# Patient Record
Sex: FEMALE | Race: BLACK OR AFRICAN AMERICAN | NOT HISPANIC OR LATINO
[De-identification: names, ages, dates, MRNs, and addresses within clinical notes are randomized per-mention and may not be internally consistent; named-entity substitution may affect disease eponyms.]

---

## 2020-07-28 PROBLEM — Z00.00 ENCOUNTER FOR PREVENTIVE HEALTH EXAMINATION: Status: ACTIVE | Noted: 2020-07-28

## 2020-07-30 ENCOUNTER — APPOINTMENT (OUTPATIENT)
Dept: ORTHOPEDIC SURGERY | Facility: CLINIC | Age: 55
End: 2020-07-30
Payer: MEDICARE

## 2020-07-30 VITALS — HEIGHT: 66 IN | BODY MASS INDEX: 40.66 KG/M2 | WEIGHT: 253 LBS

## 2020-07-30 DIAGNOSIS — M25.462 EFFUSION, LEFT KNEE: ICD-10-CM

## 2020-07-30 DIAGNOSIS — S83.289A OTHER TEAR OF LATERAL MENISCUS, CURRENT INJURY, UNSPECIFIED KNEE, INITIAL ENCOUNTER: ICD-10-CM

## 2020-07-30 PROCEDURE — 99204 OFFICE O/P NEW MOD 45 MIN: CPT | Mod: 25

## 2020-07-30 PROCEDURE — 73562 X-RAY EXAM OF KNEE 3: CPT | Mod: LT

## 2020-07-30 PROCEDURE — 20610 DRAIN/INJ JOINT/BURSA W/O US: CPT | Mod: LT

## 2020-08-12 ENCOUNTER — APPOINTMENT (OUTPATIENT)
Dept: PULMONOLOGY | Facility: CLINIC | Age: 55
End: 2020-08-12
Payer: MEDICARE

## 2020-08-12 VITALS
SYSTOLIC BLOOD PRESSURE: 125 MMHG | BODY MASS INDEX: 44.98 KG/M2 | HEIGHT: 65.16 IN | HEART RATE: 91 BPM | OXYGEN SATURATION: 90 % | WEIGHT: 273.25 LBS | DIASTOLIC BLOOD PRESSURE: 84 MMHG | RESPIRATION RATE: 17 BRPM

## 2020-08-12 DIAGNOSIS — G47.33 OBSTRUCTIVE SLEEP APNEA (ADULT) (PEDIATRIC): ICD-10-CM

## 2020-08-12 DIAGNOSIS — Z82.49 FAMILY HISTORY OF ISCHEMIC HEART DISEASE AND OTHER DISEASES OF THE CIRCULATORY SYSTEM: ICD-10-CM

## 2020-08-12 DIAGNOSIS — J45.909 UNSPECIFIED ASTHMA, UNCOMPLICATED: ICD-10-CM

## 2020-08-12 DIAGNOSIS — I10 ESSENTIAL (PRIMARY) HYPERTENSION: ICD-10-CM

## 2020-08-12 PROCEDURE — 99204 OFFICE O/P NEW MOD 45 MIN: CPT

## 2020-08-12 RX ORDER — LOSARTAN POTASSIUM 100 MG/1
TABLET, FILM COATED ORAL
Refills: 0 | Status: ACTIVE | COMMUNITY

## 2020-08-12 RX ORDER — AMLODIPINE BESYLATE 10 MG/1
10 TABLET ORAL
Refills: 0 | Status: ACTIVE | COMMUNITY

## 2020-08-12 RX ORDER — INDAPAMIDE 2.5 MG/1
2.5 TABLET, FILM COATED ORAL
Refills: 0 | Status: ACTIVE | COMMUNITY

## 2020-08-12 NOTE — ASSESSMENT
[FreeTextEntry1] : This is a 55 year old female referred by Dr. Bhatti for evaluation of possible sleep apnea. The patient has a prior diagnosis of BIRD and continues to have multiple signs and symptoms of sleep-disordered breathing including snoring and multiple nocturnal awakenings. She was referred to the [facility] for a diagnostic HSAT. The importance of weight loss as it related to BIRD was discussed and he was referred to Dr. Kulwinder Martino for weight management. She will follow up with us after the HSAT.

## 2020-08-12 NOTE — HISTORY OF PRESENT ILLNESS
[Snoring] : snoring [Frequent Nocturnal Awakening] : frequent nocturnal awakening [ESS: ___] : ESS score [unfilled] [Daytime Somnolence] : daytime somnolence [Awakes Unrefreshed] : awakening unrefreshed [Unintentional Sleep While Inactive] : unintentional sleep while inactive [Awakening With Dry Mouth] : awakening with dry mouth [To Bed: ___] : ~he/she~ goes to bed at [unfilled] [Recent  Weight Gain] : recent weight gain [Sleep Onset Latency: ___ minutes] : sleep onset latency of [unfilled] minutes reported [Arises: ___] : arises at [unfilled] [WASO: ___] : Wake time after sleep onset is [unfilled] [Nocturnal Awakenings: ___] : ~he/she~ typically has [unfilled] nocturnal awakenings [Daytime Sleep: ___] : daytime sleep: [unfilled] [TST: ___] : Total sleep time is [unfilled] [FreeTextEntry1] : This is a 55 year old female referred by Dr. Bhatti for evaluation of possible sleep apnea.\par \par Patient states that she had two prior sleep studies showing sleep apnea in the past. She was prescribed CPAP was taken back due to non-compliance which she states was after a vacation. She admits that it was difficult to tolerate. She reports snoring and nighttime awakenings for unclear reasons. Sometimes she wakes up due to leg pain.  Other sleep-related symptoms and sleep schedule are as listed below.\par \par Comorbid medical conditions include hypertension, diabetes, asthma, and osteoarthritis. Recently found to have left knee meniscal tear. \par   [Witnessed Apneas] : no witnessed sleep apnea [Unintentional Sleep while Active] : no unintentional sleep while active [Awakes with Headache] : no headache upon awakening

## 2020-08-12 NOTE — CONSULT LETTER
[Dear  ___] : Dear  [unfilled], [Consult Letter:] : I had the pleasure of evaluating your patient, [unfilled]. [Please see my note below.] : Please see my note below. [Consult Closing:] : Thank you very much for allowing me to participate in the care of this patient.  If you have any questions, please do not hesitate to contact me. [Sincerely,] : Sincerely, [FreeTextEntry3] : Rebeca Gibbons MD\par Pulmonary, Critical Care, and Sleep Medicine\par  of Medicine\par [employer] [facility]

## 2020-08-12 NOTE — PHYSICAL EXAM
[IV] : IV [Enlarged Base of the Tongue] : enlargement of the base of the tongue [General Appearance - Well Developed] : well developed [Normal Appearance] : normal appearance [Well Groomed] : well groomed [No Deformities] : no deformities [General Appearance - Well Nourished] : well nourished [Apical Impulse] : the apical impulse was normal [Neck Appearance] : the appearance of the neck was normal [General Appearance - In No Acute Distress] : no acute distress [Heart Sounds] : normal S1 and S2 [Heart Sounds Gallop] : no gallops [Heart Rate And Rhythm] : heart rate was normal and rhythm regular [Respiration, Rhythm And Depth] : normal respiratory rhythm and effort [Auscultation Breath Sounds / Voice Sounds] : lungs were clear to auscultation bilaterally [Exaggerated Use Of Accessory Muscles For Inspiration] : no accessory muscle use [Abnormal Walk] : normal gait [Involuntary Movements] : no involuntary movements were seen [Musculoskeletal - Swelling] : no joint swelling seen [Skin Color & Pigmentation] : normal skin color and pigmentation [Cyanosis, Localized] : no localized cyanosis [Nail Clubbing] : no clubbing of the fingernails [] : no rash [Skin Turgor] : normal skin turgor [No Focal Deficits] : no focal deficits [Oriented To Time, Place, And Person] : oriented to person, place, and time [Impaired Insight] : insight and judgment were intact [Affect] : the affect was normal [Mood] : the mood was normal

## 2020-08-12 NOTE — REASON FOR VISIT
[Consultation] : a consultation visit [Sleep Apnea] : sleep apnea [FreeTextEntry1] : Referred by Dr. Bhatti

## 2020-08-12 NOTE — REVIEW OF SYSTEMS
[EDS: ESS=____] : daytime somnolence: ESS=[unfilled] [Snoring] : snoring [A.M. Dry Mouth] : a.m. dry mouth [Obesity] : obesity [Diabetes] : diabetes  [Anemia] : anemia [History of Iron Deficiency] : history of iron deficiency [Nocturia] : nocturia [Arthralgias] : arthralgias [Heartburn] : heartburn [Nasal Congestion] : no nasal congestion [Witnessed Apneas] : no witnessed apnea [Shortness Of Breath] : no shortness of breath [Chest Pain] : no chest pain [Palpitations] : no palpitations [Edema] : ~T edema was not present [CHF] : no congestive heart failure [A.M. Headache] : no headache present upon awakening [Thyroid Disease] : no thyroid disease [Depression] : no depression [Fibromyalgia] : no fibromyalgia [Anxious] : not anxious [Difficulty Maintaining Sleep] : no difficulty maintaining sleep [Lower Extremity Discomfort] : no lower extremity discomfort [Difficulty Initiating Sleep] : no difficulty falling asleep [Unusual Sleep Behavior] : no unusual sleep behavior [Hypersomnolence] : not sleeping much more than usual

## 2020-08-16 ENCOUNTER — FORM ENCOUNTER (OUTPATIENT)
Age: 55
End: 2020-08-16

## 2020-08-17 ENCOUNTER — APPOINTMENT (OUTPATIENT)
Dept: SLEEP CENTER | Facility: CLINIC | Age: 55
End: 2020-08-17
Payer: MEDICARE

## 2020-08-17 ENCOUNTER — OUTPATIENT (OUTPATIENT)
Dept: OUTPATIENT SERVICES | Facility: HOSPITAL | Age: 55
LOS: 1 days | End: 2020-08-17
Payer: MEDICARE

## 2020-08-17 PROCEDURE — 95806 SLEEP STUDY UNATT&RESP EFFT: CPT | Mod: 26

## 2020-08-17 PROCEDURE — 95806 SLEEP STUDY UNATT&RESP EFFT: CPT

## 2020-08-24 DIAGNOSIS — G47.33 OBSTRUCTIVE SLEEP APNEA (ADULT) (PEDIATRIC): ICD-10-CM

## 2020-11-12 ENCOUNTER — APPOINTMENT (OUTPATIENT)
Dept: ORTHOPEDIC SURGERY | Facility: CLINIC | Age: 55
End: 2020-11-12

## 2021-03-11 ENCOUNTER — APPOINTMENT (OUTPATIENT)
Dept: ORTHOPEDIC SURGERY | Facility: CLINIC | Age: 56
End: 2021-03-11
Payer: MEDICARE

## 2021-03-11 VITALS — HEART RATE: 89 BPM | SYSTOLIC BLOOD PRESSURE: 139 MMHG | DIASTOLIC BLOOD PRESSURE: 81 MMHG

## 2021-03-11 PROCEDURE — 20610 DRAIN/INJ JOINT/BURSA W/O US: CPT | Mod: LT

## 2021-03-11 PROCEDURE — 99214 OFFICE O/P EST MOD 30 MIN: CPT | Mod: 25

## 2021-03-11 PROCEDURE — 73562 X-RAY EXAM OF KNEE 3: CPT | Mod: LT

## 2021-03-18 ENCOUNTER — LABORATORY RESULT (OUTPATIENT)
Age: 56
End: 2021-03-18

## 2021-03-18 ENCOUNTER — APPOINTMENT (OUTPATIENT)
Dept: PULMONOLOGY | Facility: CLINIC | Age: 56
End: 2021-03-18
Payer: MEDICARE

## 2021-03-18 VITALS
HEART RATE: 90 BPM | DIASTOLIC BLOOD PRESSURE: 84 MMHG | HEIGHT: 65 IN | WEIGHT: 274 LBS | SYSTOLIC BLOOD PRESSURE: 140 MMHG | OXYGEN SATURATION: 95 % | BODY MASS INDEX: 45.65 KG/M2 | TEMPERATURE: 97.3 F

## 2021-03-18 DIAGNOSIS — E66.01 MORBID (SEVERE) OBESITY DUE TO EXCESS CALORIES: ICD-10-CM

## 2021-03-18 PROCEDURE — 99214 OFFICE O/P EST MOD 30 MIN: CPT | Mod: GC

## 2021-03-18 NOTE — ASSESSMENT
[FreeTextEntry1] : 55F hx HTN, DM, asthma, OA, who comes for follow up. \par \par #BIRD\par Pt has history of snoring, obesity, daytime fatigue/sleepiness, and previously HST showing mild sleep apnea. However T90 52.6% of the study. Given obesity, concerning for OHS/BIRD. Will obtain PAP titration study with TCO2. \par \par The patient was referred to the [facility] for CPAP titration for further assessment. The ramifications of obstructive sleep apnea and its potential therapeutic modalities were discussed with the patient. The dangers of drowsy driving were discussed with the patient. The patient was warned to avoid drowsy driving. The patient will followup after results of this study are available.\par \par #Asthma\par Pt has hx of asthma. Never smoker. Currently on advair 500 bid and albuterol prn (has not used albuterol over the past week). SOB started a week ago and pt has had persistent cough productive of greenish sputum. Last steroid course and hospitalization was several years ago. On exam today, she has rhonchi throughout, which clears with cough. No wheezing. Pt potentially has mild asthma exacerbation - however pt declines steroids at this time and states that symptoms are somewhat mild. Needs better control of her asthma. 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

## 2021-03-18 NOTE — HISTORY OF PRESENT ILLNESS
[FreeTextEntry1] : 55F hx HTN, DM, asthma, OA, who comes for follow up. Last seen on 8/12/2020. Pt states that she was unable to get CPAP titration study done as she could not schedule the study. She continues to have frequent nighttime awakening due to knee pain and fatigue/sleepiness during the day. States that she goes to bed at around 8PM, falls asleep within 15 minutes and gets up at around 5AM. On average she thinks she sleeps about 4 hours. Upon awakening she usually feels ok and does not get fatigue/sleepy until 12-1PM. Does not nap during the day but if she had the opportunity she would. +snoring. No AM headaches. Previously used CPAP over 12 years ago but could not tolerate due to noise and discomfort. Has gained about 3-4 lbs since last visit. \par \par Pt also c/o persistent, productive cough - greenish phlegm. Also has had SOB for the past week and pt thinks it was instigated by cortisone shot in her knee. Has a history of asthma, which is being managed by her PMD. Currently taking advair and albuterol. Has not required extra rescue doses of albuterol over the past week. No nighttime awakenings. Cannot think of a trigger to recent SOB. States she always has a cough. Had PFTs several years ago. Last course of steroids and hospitalization was several years ago. Triggers include cold weather, exertion, AC, smoke and perfumes. No hx of COVID.\par \par HST 8/17/2020: PRAVIN 12.6/hr, T90 52.6%

## 2021-03-18 NOTE — PHYSICAL EXAM
[General Appearance - Well Developed] : well developed [General Appearance - Well Nourished] : well nourished [Neck Appearance] : the appearance of the neck was normal [Heart Rate And Rhythm] : heart rate was normal and rhythm regular [Heart Sounds] : normal S1 and S2 [] : no respiratory distress [Bowel Sounds] : normal bowel sounds [Abnormal Walk] : normal gait [Nail Clubbing] : no clubbing of the fingernails [Cyanosis, Localized] : no localized cyanosis [Skin Color & Pigmentation] : normal skin color and pigmentation [Skin Lesions] : no skin lesions [Motor Exam] : the motor exam was normal [No Focal Deficits] : no focal deficits [Oriented To Time, Place, And Person] : oriented to person, place, and time [Mood] : the mood was normal [IV] : IV [FreeTextEntry1] : +rhonchi b/l, cleared after cough

## 2021-03-18 NOTE — REVIEW OF SYSTEMS
[EDS: ESS=____] : daytime somnolence: ESS=[unfilled] [Fatigue] : fatigue [Snoring] : snoring [Shortness Of Breath] : shortness of breath [Obesity] : obesity [Difficulty Maintaining Sleep] : difficulty maintaining sleep [Negative] : Psychiatric [Nasal Congestion] : no nasal congestion [Chest Pain] : no chest pain [Palpitations] : no palpitations [A.M. Headache] : no headache present upon awakening [Leg Dysesthesias] : no leg dysesthesias [Difficulty Initiating Sleep] : no difficulty falling asleep [Lower Extremity Discomfort] : no lower extremity discomfort [Late day/ Evening symptoms] : no late day/evening symptoms [Unusual Sleep Behavior] : no unusual sleep behavior [Hypersomnolence] : not sleeping much more than usual [Cataplexy] :  no cataplexy

## 2021-03-18 NOTE — REASON FOR VISIT
[Follow-Up] : a follow-up visit [Asthma] : asthma [Nocturna Hypoventilation] : nocturna  hypoventilation [Sleep Apnea] : sleep apnea

## 2021-03-24 ENCOUNTER — NON-APPOINTMENT (OUTPATIENT)
Age: 56
End: 2021-03-24

## 2021-03-24 LAB
BACTERIA SPT CF RESP CULT: NORMAL
BASOPHILS # BLD AUTO: 0.03 K/UL
BASOPHILS NFR BLD AUTO: 0.3 %
EOSINOPHIL # BLD AUTO: 0.31 K/UL
EOSINOPHIL NFR BLD AUTO: 3.4 %
HCT VFR BLD CALC: 42.1 %
HGB BLD-MCNC: 12.9 G/DL
IGE SER-MCNC: 125 KU/L
IMM GRANULOCYTES NFR BLD AUTO: 0.5 %
LYMPHOCYTES # BLD AUTO: 3.41 K/UL
LYMPHOCYTES NFR BLD AUTO: 37.3 %
MAN DIFF?: NORMAL
MCHC RBC-ENTMCNC: 25.4 PG
MCHC RBC-ENTMCNC: 30.6 GM/DL
MCV RBC AUTO: 83 FL
MONOCYTES # BLD AUTO: 0.74 K/UL
MONOCYTES NFR BLD AUTO: 8.1 %
NEUTROPHILS # BLD AUTO: 4.6 K/UL
NEUTROPHILS NFR BLD AUTO: 50.4 %
PLATELET # BLD AUTO: 347 K/UL
RBC # BLD: 5.07 M/UL
RBC # FLD: 17 %
WBC # FLD AUTO: 9.14 K/UL

## 2021-03-25 LAB
A ALTERNATA IGE QN: 9.02 KUA/L
A FUMIGATUS IGE QN: 0.18 KUA/L
BERMUDA GRASS IGE QN: <0.1 KUA/L
BOXELDER IGE QN: <0.1 KUA/L
C HERBARUM IGE QN: 0.29 KUA/L
CALIF WALNUT IGE QN: <0.1 KUA/L
CAT DANDER IGE QN: 0.36 KUA/L
CMN PIGWEED IGE QN: 0.15 KUA/L
COMMON RAGWEED IGE QN: <0.1 KUA/L
COTTONWOOD IGE QN: <0.1 KUA/L
D FARINAE IGE QN: 0.1 KUA/L
D PTERONYSS IGE QN: 0.13 KUA/L
DEPRECATED A ALTERNATA IGE RAST QL: 3
DEPRECATED A FUMIGATUS IGE RAST QL: NORMAL
DEPRECATED BERMUDA GRASS IGE RAST QL: 0
DEPRECATED BOXELDER IGE RAST QL: 0
DEPRECATED C HERBARUM IGE RAST QL: NORMAL
DEPRECATED CAT DANDER IGE RAST QL: 1
DEPRECATED COMMON PIGWEED IGE RAST QL: NORMAL
DEPRECATED COMMON RAGWEED IGE RAST QL: 0
DEPRECATED COTTONWOOD IGE RAST QL: 0
DEPRECATED D FARINAE IGE RAST QL: NORMAL
DEPRECATED D PTERONYSS IGE RAST QL: NORMAL
DEPRECATED DOG DANDER IGE RAST QL: NORMAL
DEPRECATED GOOSEFOOT IGE RAST QL: NORMAL
DEPRECATED LONDON PLANE IGE RAST QL: 0
DEPRECATED MOUSE URINE PROT IGE RAST QL: 0
DEPRECATED MUGWORT IGE RAST QL: 0
DEPRECATED P NOTATUM IGE RAST QL: NORMAL
DEPRECATED RED CEDAR IGE RAST QL: 0
DEPRECATED ROACH IGE RAST QL: NORMAL
DEPRECATED SHEEP SORREL IGE RAST QL: 0
DEPRECATED SILVER BIRCH IGE RAST QL: 0
DEPRECATED TIMOTHY IGE RAST QL: 0
DEPRECATED WHITE ASH IGE RAST QL: 0
DEPRECATED WHITE OAK IGE RAST QL: 0
DOG DANDER IGE QN: 0.11 KUA/L
GOOSEFOOT IGE QN: 0.13 KUA/L
LONDON PLANE IGE QN: <0.1 KUA/L
MOUSE URINE PROT IGE QN: <0.1 KUA/L
MUGWORT IGE QN: <0.1 KUA/L
MULBERRY (T70) CLASS: 0
MULBERRY (T70) CONC: <0.1 KUA/L
P NOTATUM IGE QN: 0.21 KUA/L
RED CEDAR IGE QN: <0.1 KUA/L
ROACH IGE QN: 0.22 KUA/L
SHEEP SORREL IGE QN: <0.1 KUA/L
SILVER BIRCH IGE QN: <0.1 KUA/L
TIMOTHY IGE QN: <0.1 KUA/L
TREE ALLERG MIX1 IGE QL: 0
WHITE ASH IGE QN: <0.1 KUA/L
WHITE ELM IGE QN: 0
WHITE ELM IGE QN: <0.1 KUA/L
WHITE OAK IGE QN: <0.1 KUA/L

## 2021-03-27 ENCOUNTER — APPOINTMENT (OUTPATIENT)
Dept: DISASTER EMERGENCY | Facility: CLINIC | Age: 56
End: 2021-03-27

## 2021-03-27 DIAGNOSIS — Z01.818 ENCOUNTER FOR OTHER PREPROCEDURAL EXAMINATION: ICD-10-CM

## 2021-03-28 LAB — SARS-COV-2 N GENE NPH QL NAA+PROBE: NOT DETECTED

## 2021-04-01 ENCOUNTER — APPOINTMENT (OUTPATIENT)
Dept: SLEEP CENTER | Facility: CLINIC | Age: 56
End: 2021-04-01
Payer: MEDICARE

## 2021-04-01 ENCOUNTER — OUTPATIENT (OUTPATIENT)
Dept: OUTPATIENT SERVICES | Facility: HOSPITAL | Age: 56
LOS: 1 days | End: 2021-04-01
Payer: MEDICARE

## 2021-04-01 PROCEDURE — 95811 POLYSOM 6/>YRS CPAP 4/> PARM: CPT

## 2021-04-01 PROCEDURE — 95811 POLYSOM 6/>YRS CPAP 4/> PARM: CPT | Mod: 26

## 2021-04-02 ENCOUNTER — TRANSCRIPTION ENCOUNTER (OUTPATIENT)
Age: 56
End: 2021-04-02

## 2021-04-02 DIAGNOSIS — G47.33 OBSTRUCTIVE SLEEP APNEA (ADULT) (PEDIATRIC): ICD-10-CM

## 2021-04-13 ENCOUNTER — NON-APPOINTMENT (OUTPATIENT)
Age: 56
End: 2021-04-13

## 2021-04-20 ENCOUNTER — NON-APPOINTMENT (OUTPATIENT)
Age: 56
End: 2021-04-20

## 2021-04-20 DIAGNOSIS — R06.89 OTHER ABNORMALITIES OF BREATHING: ICD-10-CM

## 2021-04-22 ENCOUNTER — NON-APPOINTMENT (OUTPATIENT)
Age: 56
End: 2021-04-22

## 2021-04-22 DIAGNOSIS — R94.2 ABNORMAL RESULTS OF PULMONARY FUNCTION STUDIES: ICD-10-CM

## 2021-04-22 DIAGNOSIS — J96.12 CHRONIC RESPIRATORY FAILURE WITH HYPERCAPNIA: ICD-10-CM

## 2021-04-26 DIAGNOSIS — G47.36 SLEEP RELATED HYPOVENTILATION IN CONDITIONS CLASSIFIED ELSEWHERE: ICD-10-CM

## 2021-05-03 ENCOUNTER — RX RENEWAL (OUTPATIENT)
Age: 56
End: 2021-05-03

## 2021-06-16 ENCOUNTER — APPOINTMENT (OUTPATIENT)
Dept: PULMONOLOGY | Facility: CLINIC | Age: 56
End: 2021-06-16
Payer: MEDICARE

## 2021-06-16 VITALS
WEIGHT: 276 LBS | HEART RATE: 96 BPM | OXYGEN SATURATION: 95 % | SYSTOLIC BLOOD PRESSURE: 135 MMHG | BODY MASS INDEX: 45.93 KG/M2 | DIASTOLIC BLOOD PRESSURE: 81 MMHG | TEMPERATURE: 98 F | RESPIRATION RATE: 17 BRPM

## 2021-06-16 DIAGNOSIS — Z23 ENCOUNTER FOR IMMUNIZATION: ICD-10-CM

## 2021-06-16 PROCEDURE — 99214 OFFICE O/P EST MOD 30 MIN: CPT

## 2021-06-17 NOTE — ASSESSMENT
[FreeTextEntry1] : 55 year old female asthma, mild BIRD, nocturnal hypoventilation due to OHS on bilevel, here for a follow up visit after initiation of therapy.\par 1) In regards to BRID/BIRD, data is unavailable on Care . Reached out to [facility] regarding this issue. Will perform nocturnal oximetry on bilevel once data available. Discuss the recent [employer] voluntary recall for DreamStation bilevel devices and the decision was made to continue therapy after a very thorough discussion of the risks and benefits of continued use until their repaired or fully replaced given the nocturnal hypoventilation and potential harm if pausing therapy.\par  2) Asthma- currently with mild wheezing. Has not been using Advair. Resume Advair and if no significant improvement in symptoms she will contact me over next 1 - 2 days. \par \par Follow up in 2 weeks\par

## 2021-06-17 NOTE — PHYSICAL EXAM
[General Appearance - Well Developed] : well developed [General Appearance - Well Nourished] : well nourished [IV] : IV [Neck Appearance] : the appearance of the neck was normal [Heart Rate And Rhythm] : heart rate was normal and rhythm regular [Heart Sounds] : normal S1 and S2 [] : no respiratory distress [Abnormal Walk] : normal gait [Bowel Sounds] : normal bowel sounds [Nail Clubbing] : no clubbing of the fingernails [Cyanosis, Localized] : no localized cyanosis [Skin Color & Pigmentation] : normal skin color and pigmentation [Skin Lesions] : no skin lesions [Motor Exam] : the motor exam was normal [No Focal Deficits] : no focal deficits [Oriented To Time, Place, And Person] : oriented to person, place, and time [Mood] : the mood was normal [FreeTextEntry1] : +rhonchi b/l, cleared after cough

## 2021-06-17 NOTE — REVIEW OF SYSTEMS
[EDS: ESS=____] : daytime somnolence: ESS=[unfilled] [Fatigue] : fatigue [Shortness Of Breath] : shortness of breath [Obesity] : obesity [Difficulty Maintaining Sleep] : difficulty maintaining sleep [Negative] : Psychiatric [Nasal Congestion] : no nasal congestion [Snoring] : no snoring [Chest Pain] : no chest pain [Palpitations] : no palpitations [A.M. Headache] : no headache present upon awakening [Leg Dysesthesias] : no leg dysesthesias [Difficulty Initiating Sleep] : no difficulty falling asleep [Lower Extremity Discomfort] : no lower extremity discomfort [Late day/ Evening symptoms] : no late day/evening symptoms [Unusual Sleep Behavior] : no unusual sleep behavior [Hypersomnolence] : not sleeping much more than usual [Cataplexy] :  no cataplexy

## 2021-06-17 NOTE — HISTORY OF PRESENT ILLNESS
[FreeTextEntry1] : 55 year old female asthma, mild BIRD, nocturnal hypoventilation on bilevel, here for a follow up visit after initiation of therapy.\par \par Comorbid medical conditions include hypertension, diabetes, and osteoarthritis. \par \par Previously diagnosed with BIRD and was on CPAP ~ 12 years ago but could not tolerate. HST (8/17/2020) showed an PRAVIN of 12.6/hr, T90 of 52.6% with baseline saturation 89-91% suggestive of nocturnal hypoventilation. Bilevel titration (4/1/2021) showed persistent hypercapnia and hypoxemia despite optimal bilevel and a Trilogy device was ordered but denied coverage so the decision was made to treat with bilevel S at a pressure of 20/10 cm H2O and eventually check nocturnal oximetry on these settings. She has been using bilevel for about 1 month and trying her best to use nightly for 4 - 5 hours per night. Sometimes the mask does not fit appropriately. DME is [facility]. \par \par In regards to asthma, she has started Singulair with improvement in asthma symptoms. She has not been using Advair and notes some chest tightness.

## 2021-07-07 ENCOUNTER — APPOINTMENT (OUTPATIENT)
Dept: PULMONOLOGY | Facility: CLINIC | Age: 56
End: 2021-07-07
Payer: MEDICARE

## 2021-07-07 VITALS
HEIGHT: 65 IN | DIASTOLIC BLOOD PRESSURE: 76 MMHG | TEMPERATURE: 97.6 F | BODY MASS INDEX: 46.15 KG/M2 | WEIGHT: 277 LBS | HEART RATE: 82 BPM | RESPIRATION RATE: 16 BRPM | OXYGEN SATURATION: 97 % | SYSTOLIC BLOOD PRESSURE: 111 MMHG

## 2021-07-07 PROCEDURE — 99214 OFFICE O/P EST MOD 30 MIN: CPT

## 2021-07-07 NOTE — PROCEDURE
[FreeTextEntry1] : REYNA WEST  is here in the office due to trouble tolerating CPAP mask.  \par 55 year old REYNA WEST on BiPAP therapy for OHS, mild BIRD. \par currently utilizing a airtouch F20 mask, was referred to our office for a mask fitting\par for better fit. \par \par \par \par The following service was provided for the patient in-office:\par -	Patient was fitted with the following mask(s): eson 2 S & M, F30 i sm f, sm cush, could benefit from stand frame, Vitera sm, forgot to give pt med head gear.  will leave for pt on next visit. \par -	Patient was educated on mask interfaces, usage, fit, switching masks with tubing, and PAP cleaning tips.\par -	Patient was advised to try mask at home with their PAP machine. \par -	Pt will call or email in about one week to F/U on mask fits.\par \par discussed recall as pt has not felt comfortable to use PAP since she heard about it.  Discussed the foam degradation and possible effects.  Weighed the risk vs the benefits of continuing therapy. I advised pt Eve plans to fix or replace all affected devices but we do not have a time line for when. Advised against drowsy driving. Advised against so clean & ozone based .  As per Dr Gibbons pt needs this PAP for nocturnal hypoventilation and cannot pause therapy, pt advised and agreed.\par \par  \par \par \par \par \par    \par \par Time spent with pt:       30                        \par \par \par \par \par \par

## 2021-07-19 ENCOUNTER — APPOINTMENT (OUTPATIENT)
Dept: PULMONOLOGY | Facility: CLINIC | Age: 56
End: 2021-07-19
Payer: MEDICARE

## 2021-07-19 VITALS
TEMPERATURE: 97.3 F | HEIGHT: 65 IN | BODY MASS INDEX: 46.65 KG/M2 | DIASTOLIC BLOOD PRESSURE: 79 MMHG | HEART RATE: 80 BPM | WEIGHT: 280 LBS | SYSTOLIC BLOOD PRESSURE: 120 MMHG | RESPIRATION RATE: 16 BRPM

## 2021-07-19 PROCEDURE — 99214 OFFICE O/P EST MOD 30 MIN: CPT

## 2021-07-19 NOTE — PROCEDURE
[FreeTextEntry1] : REYNA WEST  is here in the office due to trouble tolerating CPAP mask.  \par 55 year old REYNA WEST on PAP therapy at 10/20 cm H2O for mild degree of BIRD & severe desats, \par currently utilizing a ffm mask, was referred to our office for a mask fitting\par for better fit, trouble acclimating. \par \par \par \par The following service was provided for the patient in-office:\par -	Patient was fitted with the following mask(s): Resmed airtopuch N20 . Masks were tried in office with pts pap. Pt ramp tuned on to [id number] for 30 mins. \par -	Patient was educated on mask interfaces, usage, fit, switching masks with tubing, and PAP cleaning tips.\par -	Patient was advised to try mask at home with their PAP machine. \par -	Pt will call or email in about one week to F/U on mask fits.\par \par \par \par \par    \par \par Time spent with pt:        30                       \par \par \par \par \par \par

## 2021-10-07 ENCOUNTER — APPOINTMENT (OUTPATIENT)
Dept: PULMONOLOGY | Facility: CLINIC | Age: 56
End: 2021-10-07
Payer: MEDICARE

## 2021-10-07 DIAGNOSIS — E66.2 MORBID (SEVERE) OBESITY WITH ALVEOLAR HYPOVENTILATION: ICD-10-CM

## 2021-10-07 PROCEDURE — 99214 OFFICE O/P EST MOD 30 MIN: CPT | Mod: 95

## 2021-10-07 NOTE — ASSESSMENT
[FreeTextEntry1] : 56 year old female asthma, mild BIRD, nocturnal hypoventilation due to OHS on bilevel, here for a follow up visit.\par \par 1) In regards to BIRD/BIRD, data is unavailable on Care . She states [facility] contacted her to reset SD Card/modem but she admittedly has not really used because she states she is scared given the recall. Reviwed risks vs benefits and she would like to hold off on using. Given the hypoventilation is due to obesity she will try weight loss and would like to a sleep study while she is propped on pillows. Device is registered with [employer].\par  2) Asthma- Continue Advair and Singulair.\par \par Follow up in after sleep study.\par

## 2021-10-07 NOTE — HISTORY OF PRESENT ILLNESS
[Home] : at home, [unfilled] , at the time of the visit. [Medical Office: ([facility])___] : at the medical office located in  [Verbal consent obtained from patient] : the patient, [unfilled] [FreeTextEntry1] : 56 year old female asthma, mild BIRD, nocturnal hypoventilation on bilevel, here for a follow up visit.\par \par Comorbid medical conditions include hypertension, diabetes, and osteoarthritis. \par \par Previously diagnosed with BIRD and was on CPAP ~ 12 years ago but could not tolerate. HST (8/17/2020) showed an PRAVIN of 12.6/hr, T90 of 52.6% with baseline saturation 89-91% suggestive of nocturnal hypoventilation. Bilevel titration (4/1/2021) showed persistent hypercapnia and hypoxemia despite optimal bilevel and a Trilogy device was ordered but denied coverage so the decision was made to treat with bilevel S at a pressure of 20/10 cm H2O and eventually check nocturnal oximetry on these settings. DME is [facility]. \par \par She has not used the device because she is afraid given the recall. In regards to asthma, she is doing well and uses Advair BID and Singulair. She rarely requires rescue inhaler.

## 2021-10-07 NOTE — REVIEW OF SYSTEMS
[EDS: ESS=____] : daytime somnolence: ESS=[unfilled] [Fatigue] : fatigue [Nasal Congestion] : no nasal congestion [Snoring] : no snoring [Shortness Of Breath] : shortness of breath [Chest Pain] : no chest pain [Palpitations] : no palpitations [Obesity] : obesity [A.M. Headache] : no headache present upon awakening [Leg Dysesthesias] : no leg dysesthesias [Difficulty Initiating Sleep] : no difficulty falling asleep [Difficulty Maintaining Sleep] : difficulty maintaining sleep [Lower Extremity Discomfort] : no lower extremity discomfort [Late day/ Evening symptoms] : no late day/evening symptoms [Unusual Sleep Behavior] : no unusual sleep behavior [Hypersomnolence] : not sleeping much more than usual [Cataplexy] :  no cataplexy [Negative] : Psychiatric

## 2021-11-17 ENCOUNTER — APPOINTMENT (OUTPATIENT)
Dept: SLEEP CENTER | Facility: CLINIC | Age: 56
End: 2021-11-17
Payer: MEDICARE

## 2021-11-17 ENCOUNTER — OUTPATIENT (OUTPATIENT)
Dept: OUTPATIENT SERVICES | Facility: HOSPITAL | Age: 56
LOS: 1 days | End: 2021-11-17
Payer: MEDICARE

## 2021-11-17 PROCEDURE — G0399: CPT

## 2021-11-17 PROCEDURE — 95806 SLEEP STUDY UNATT&RESP EFFT: CPT | Mod: 26

## 2021-11-22 ENCOUNTER — RX RENEWAL (OUTPATIENT)
Age: 56
End: 2021-11-22

## 2021-11-22 ENCOUNTER — NON-APPOINTMENT (OUTPATIENT)
Age: 56
End: 2021-11-22

## 2021-11-22 DIAGNOSIS — G47.33 OBSTRUCTIVE SLEEP APNEA (ADULT) (PEDIATRIC): ICD-10-CM

## 2022-02-16 ENCOUNTER — APPOINTMENT (OUTPATIENT)
Dept: PULMONOLOGY | Facility: CLINIC | Age: 57
End: 2022-02-16
Payer: MEDICARE

## 2022-02-16 VITALS
RESPIRATION RATE: 16 BRPM | BODY MASS INDEX: 43.37 KG/M2 | WEIGHT: 260.31 LBS | SYSTOLIC BLOOD PRESSURE: 130 MMHG | HEART RATE: 93 BPM | DIASTOLIC BLOOD PRESSURE: 79 MMHG | HEIGHT: 65 IN | OXYGEN SATURATION: 98 %

## 2022-02-16 PROCEDURE — G0009: CPT

## 2022-02-16 PROCEDURE — 99214 OFFICE O/P EST MOD 30 MIN: CPT | Mod: 25

## 2022-02-16 PROCEDURE — 90732 PPSV23 VACC 2 YRS+ SUBQ/IM: CPT

## 2022-02-16 RX ORDER — FLUTICASONE PROPIONATE AND SALMETEROL 100; 50 UG/1; UG/1
100-50 POWDER RESPIRATORY (INHALATION) TWICE DAILY
Qty: 3 | Refills: 3 | Status: ACTIVE | COMMUNITY
Start: 1900-01-01 | End: 1900-01-01

## 2022-02-16 NOTE — PHYSICAL EXAM
[General Appearance - Well Developed] : well developed [General Appearance - Well Nourished] : well nourished [IV] : IV [Neck Appearance] : the appearance of the neck was normal [Heart Rate And Rhythm] : heart rate was normal and rhythm regular [Heart Sounds] : normal S1 and S2 [] : no respiratory distress [Bowel Sounds] : normal bowel sounds [Abnormal Walk] : normal gait [Nail Clubbing] : no clubbing of the fingernails [Cyanosis, Localized] : no localized cyanosis [Skin Color & Pigmentation] : normal skin color and pigmentation [Skin Lesions] : no skin lesions [Motor Exam] : the motor exam was normal [No Focal Deficits] : no focal deficits [Oriented To Time, Place, And Person] : oriented to person, place, and time [Mood] : the mood was normal [FreeTextEntry1] : +rhonchi b/l, cleared after cough

## 2022-02-16 NOTE — HISTORY OF PRESENT ILLNESS
[FreeTextEntry1] : 56 year old female asthma, mild BIRD, nocturnal hypoventilation on bilevel, here for a follow up visit.\par \par Comorbid medical conditions include hypertension, diabetes, osteoarthritis, and class III obesity. She has lost about 20 lbs, currently 260 lbs. Weight was 253 lbs around time of initial HST in 2020.\par \par Previously diagnosed with BIRD and was on CPAP ~ 12 years ago but could not tolerate. HST (8/17/2020) showed an PRAVIN of 12.6/hr, T90 of 52.6% with baseline saturation 89-91% suggestive of nocturnal hypoventilation. Bilevel titration (4/1/2021) showed persistent hypercapnia and hypoxemia despite optimal bilevel and a Trilogy device was ordered but denied coverage so the decision was made to treat with bilevel S at a pressure of 20/10 cm H2O and eventually check nocturnal oximetry on these settings. However, due to the recall device was stopped. Repeat HST (11/17/2021) showed an AHI of 24.6/hr and T90 of 62.2% Advised to resume therapy. She received replacement but was sent an incorrect device. She received the correct device about one month prior but she was afraid to start using again. DME is [facility]. \par \par In regards to asthma, she is doing well and uses Advair BID and Singulair. She rarely requires rescue inhaler unless she has not used Advair.

## 2022-02-16 NOTE — ASSESSMENT
[FreeTextEntry1] : 56 year old female asthma, mild BIRD, nocturnal hypoventilation due to OHS on bilevel, here for a follow up visit.\par \par 1) BIRD/OHS - Has not been using due to recall. Advised patient importance of use and given than she received replacement, she should resume therapy. She will email me picture of latest device received.\par \par  2) Asthma- Continue Advair and Singulair. Refills sent to pharmacy.\par \par 3) HCM - Pneumovax today. UTD with COVID vaccination including booster. Declines flu shot today.\par \par Follow up in 3 weeks for compliance check/data download.\par

## 2022-03-03 ENCOUNTER — APPOINTMENT (OUTPATIENT)
Dept: PULMONOLOGY | Facility: CLINIC | Age: 57
End: 2022-03-03
Payer: MEDICARE

## 2022-03-03 PROCEDURE — 99214 OFFICE O/P EST MOD 30 MIN: CPT | Mod: 95

## 2022-03-03 NOTE — ASSESSMENT
[FreeTextEntry1] : 56 year old female asthma, mild BIRD, nocturnal hypoventilation due to OHS on bilevel, here for a follow up visit for compliance check and data download.\par \par BIRD/OHS -unfortunately her device is not connected and care orchestrated.  [facility] and asked them to reset the modem.  I will follow up with the patient after the data is available.\par \par \par

## 2022-03-03 NOTE — HISTORY OF PRESENT ILLNESS
[Home] : at home, [unfilled] , at the time of the visit. [Medical Office: ([facility])___] : at the medical office located in  [Verbal consent obtained from patient] : the patient, [unfilled] [FreeTextEntry1] : 56 year old female asthma, mild BIRD, nocturnal hypoventilation on bilevel, here for a follow up visit.\par \par Comorbid medical conditions include hypertension, diabetes, osteoarthritis, and class III obesity. She has lost about 20 lbs, currently 260 lbs. Weight was 253 lbs around time of initial HST in 2020.\par \par Previously diagnosed with BIRD and was on CPAP ~ 12 years ago but could not tolerate. HST (8/17/2020) showed an PRAVIN of 12.6/hr, T90 of 52.6% with baseline saturation 89-91% suggestive of nocturnal hypoventilation. Bilevel titration (4/1/2021) showed persistent hypercapnia and hypoxemia despite optimal bilevel and a Trilogy device was ordered but denied coverage so the decision was made to treat with bilevel S at a pressure of 20/10 cm H2O and eventually check nocturnal oximetry on these settings. However, due to the recall device was stopped. Repeat HST (11/17/2021) showed an AHI of 24.6/hr and T90 of 62.2% Advised to resume therapy. She received replacement but was sent an incorrect device. DME is [facility].  Data is unfortunately unavailable on Care .  She has resumed using therapy and feels a benefit from the use she is tolerating the pressures but feels that the air is too warm.\par \par In regards to asthma, she is doing well and uses Advair BID and Singulair. She rarely requires rescue inhaler unless she has not used Advair.

## 2022-03-10 ENCOUNTER — APPOINTMENT (OUTPATIENT)
Dept: PULMONOLOGY | Facility: CLINIC | Age: 57
End: 2022-03-10

## 2022-04-08 ENCOUNTER — APPOINTMENT (OUTPATIENT)
Dept: ORTHOPEDIC SURGERY | Facility: CLINIC | Age: 57
End: 2022-04-08
Payer: MEDICARE

## 2022-04-08 DIAGNOSIS — M17.12 UNILATERAL PRIMARY OSTEOARTHRITIS, LEFT KNEE: ICD-10-CM

## 2022-04-08 PROCEDURE — 73562 X-RAY EXAM OF KNEE 3: CPT | Mod: LT

## 2022-04-08 PROCEDURE — 99215 OFFICE O/P EST HI 40 MIN: CPT

## 2022-04-08 NOTE — ADDENDUM
[FreeTextEntry1] : I, James Penn, acted solely as a scribe for Dr. Ezio Deluna MD on this date 04/08/2022  .\par  \par All medical record entries made by the Scribe were at my, Dr. Ezio Deluna MD , direction and personally dictated by me on 04/08/2022 . I have reviewed the chart and agree that the record accurately reflects my personal performance of the history, physical exam, assessment and plan. I have also personally directed, reviewed, and agreed with the chart.

## 2022-04-08 NOTE — PHYSICAL EXAM
[Antalgic] : antalgic [Normal RUE] : Right Upper Extremity: No scars, rashes, lesions, ulcers, skin intact [Normal Finger/nose] : finger to nose coordination [Normal Touch] : sensation intact for touch [Obese] : obese [Normal] : no peripheral adenopathy appreciated [Poor Appearance] : well-appearing [Acute Distress] : not in acute distress [de-identified] : Patient appears stated age in no acute respiratory distress. Patient is alert oriented x3. Patient has normal mood and affect. \par \par Right knee exam\par Range of motion of the knee is 0-120°. \par Skin is normal.  No rash.\par There is no effusion. No medial or lateral joint line tenderness. No swelling, no pitting edema.\par Overall alignment of the knee is then slight valgus. Good anterior posterior stability. Firm endpoints on anterior and posterior drawer. \par Medial lateral stability is intact. Firm endpoint on medial and lateral stress testing .\par Shelly test is negative.\par Quadriceps strength 5/ 5. There is no loss of muscle volume in the thigh. \par Good anterior posterior and mediolateral stability.\par Sensation in the extremities intact. \par Discrimination is intact. Good DP and PT pulses.\par 		\par \par Bilateral hip exam\par On inspection of the hip shows skin is normal. No evidence of rash. \par No loss of muscle.  Abductor strength is 5 out of 5. Hip flexor strength is 5.\par Range of motion of the hip at 90° flexion internal rotation is 15° external rotation is 30° pain-free. \par Hip has good stability in anterior and posterior direction. \par On lateral decubitus  examination there is no tenderness in the greater trochanter. \par Lower Extremity Examination \par Bilateral lower extremity skin is normal. There is no rash. There is no edema and lymphadenopathy.  DP and PT pulses intact. Sensation is intact.\par \par Left knee exam\par There is minimal to moderate effusion. Range of motion is 0-120°. Painful at the extremes of range of motion. \par There is medial and lateral joint line tenderness. \par Quadriceps strength is 4/5. There is some muscle loss in the quadriceps. \par Anteroposterior and mediolateral stability is intact Shelly test is negative. Alignment of the knee is in 10 degrees valgus [de-identified] : X-rays of the left knee 3 views shows advanced degenerative bone-on-bone valgus arthritis.\par \par MRI shows severe degenerative lateral meniscus tear and bone-on-bone lateral lateral compartment arthritis and patellofemoral arthritis

## 2022-04-08 NOTE — DISCUSSION/SUMMARY
[Surgical risks reviewed] : Surgical risks reviewed [de-identified] : Patient has advanced left knee bone-on-bone degenerative arthritis.  This time treatment options was discussed with the patient including conservative and surgical treatment options.\par \par At this point patient has tried all conservative treatment options including weight loss NSAIDs. Patient also has had multiple injections in the knee. This has not helped the patient. Patient is considering surgical treatment options. All the pros and cons of operative versus nonoperative treatment discussed with the patient. All of the potential risks including possible infection possible bleeding possible need for repeat surgery possible medical complications possible blood clots discussed with the patient. Patient completely understands the risks and benefits.\par The risks of re-admission discussed with the patient as well. Also the need for anticoagulation discussed with the patient. Patient prefers to take aspirin 325 twice a day.\par Patient completely understands the risks and benefits and wants to undergo the procedure - LEFT TOTAL KNEE REPLACEMENT.\par I reviewed the plan of care as well as used a model of a total knee implant equivalent to the one that will be used for their total knee  joint replacement. The ability to secure the implant utilizing cement or cementless (press-fit) was discussed with the patient. The patient agrees with the plan of care, as well as the use of implants for their total joint replacement.\par The patient is advised to get medical clearance. \par Patient is also advised to attend a joint replacement education class.\par

## 2022-04-08 NOTE — HISTORY OF PRESENT ILLNESS
[Worsening] : worsening [___ yrs] : [unfilled] year(s) ago [7] : a current pain level of 7/10 [10] : a maximum pain level of 10/10 [Sitting] : sitting [Standing] : standing [Intermit.] : ~He/She~ states the symptoms seem to be intermittent [Walking] : worsened by walking [Knee Flexion] : worsened with knee flexion [Knee Extension] : worsened with knee extension [Acetaminophen] : relieved by acetaminophen [NSAIDs] : relieved by nonsteroidal anti-inflammatory drugs [Rest] : relieved by rest [de-identified] : :Patient presents to office following up for left knee pain, last visit received Cortizone injection with relief for over 6 months, here today due to symptoms having returned. \par Patient is an obese female, PMhx htn, asthma, dm2,  presents to the office walking on her own complaining of left knee pain x 2 years, insidious onset, progressively worsening after Patein started exercise regiment. Reports swelling, instability and weakness. Pain indicated to anterolateral aspect of knee, 7/10, intermittent,achy and sharp at times. \par Worsening with walking and stairs to point affecting quality of life and preventing some adl's. \par Some relief with rest, physical therapy and Nsaids. (x 1 year). Presents with MRI ([facility])\par Patient denies any fever, chills, trauma, swelling, erythema, hematomas, numbness or tingling sensation, instability or buckling.\par

## 2022-04-13 ENCOUNTER — APPOINTMENT (OUTPATIENT)
Dept: CT IMAGING | Facility: CLINIC | Age: 57
End: 2022-04-13
Payer: MEDICARE

## 2022-04-13 ENCOUNTER — OUTPATIENT (OUTPATIENT)
Dept: OUTPATIENT SERVICES | Facility: HOSPITAL | Age: 57
LOS: 1 days | End: 2022-04-13
Payer: MEDICARE

## 2022-04-13 DIAGNOSIS — M25.462 EFFUSION, LEFT KNEE: ICD-10-CM

## 2022-04-13 PROCEDURE — 73700 CT LOWER EXTREMITY W/O DYE: CPT | Mod: ME

## 2022-04-13 PROCEDURE — G1004: CPT

## 2022-04-13 PROCEDURE — 73700 CT LOWER EXTREMITY W/O DYE: CPT | Mod: 26,LT,ME

## 2022-05-16 ENCOUNTER — OUTPATIENT (OUTPATIENT)
Dept: OUTPATIENT SERVICES | Facility: HOSPITAL | Age: 57
LOS: 1 days | End: 2022-05-16
Payer: MEDICARE

## 2022-05-16 VITALS
HEART RATE: 72 BPM | TEMPERATURE: 98 F | HEIGHT: 66 IN | DIASTOLIC BLOOD PRESSURE: 85 MMHG | RESPIRATION RATE: 17 BRPM | WEIGHT: 259.93 LBS | SYSTOLIC BLOOD PRESSURE: 128 MMHG | OXYGEN SATURATION: 96 %

## 2022-05-16 DIAGNOSIS — J45.909 UNSPECIFIED ASTHMA, UNCOMPLICATED: ICD-10-CM

## 2022-05-16 DIAGNOSIS — G47.33 OBSTRUCTIVE SLEEP APNEA (ADULT) (PEDIATRIC): ICD-10-CM

## 2022-05-16 DIAGNOSIS — Z01.818 ENCOUNTER FOR OTHER PREPROCEDURAL EXAMINATION: ICD-10-CM

## 2022-05-16 DIAGNOSIS — M17.12 UNILATERAL PRIMARY OSTEOARTHRITIS, LEFT KNEE: ICD-10-CM

## 2022-05-16 DIAGNOSIS — Z98.891 HISTORY OF UTERINE SCAR FROM PREVIOUS SURGERY: Chronic | ICD-10-CM

## 2022-05-16 DIAGNOSIS — Z98.890 OTHER SPECIFIED POSTPROCEDURAL STATES: Chronic | ICD-10-CM

## 2022-05-16 PROCEDURE — 80048 BASIC METABOLIC PNL TOTAL CA: CPT

## 2022-05-16 PROCEDURE — 86901 BLOOD TYPING SEROLOGIC RH(D): CPT

## 2022-05-16 PROCEDURE — 83036 HEMOGLOBIN GLYCOSYLATED A1C: CPT

## 2022-05-16 PROCEDURE — 86900 BLOOD TYPING SEROLOGIC ABO: CPT

## 2022-05-16 PROCEDURE — 86850 RBC ANTIBODY SCREEN: CPT

## 2022-05-16 PROCEDURE — 87640 STAPH A DNA AMP PROBE: CPT

## 2022-05-16 PROCEDURE — G0463: CPT

## 2022-05-16 PROCEDURE — 85027 COMPLETE CBC AUTOMATED: CPT

## 2022-05-16 PROCEDURE — 87641 MR-STAPH DNA AMP PROBE: CPT

## 2022-05-16 RX ORDER — SODIUM CHLORIDE 9 MG/ML
3 INJECTION INTRAMUSCULAR; INTRAVENOUS; SUBCUTANEOUS EVERY 8 HOURS
Refills: 0 | Status: DISCONTINUED | OUTPATIENT
Start: 2022-06-06 | End: 2022-06-06

## 2022-05-16 RX ORDER — TRAMADOL HYDROCHLORIDE 50 MG/1
50 TABLET ORAL ONCE
Refills: 0 | Status: DISCONTINUED | OUTPATIENT
Start: 2022-06-06 | End: 2022-06-06

## 2022-05-16 RX ORDER — PANTOPRAZOLE SODIUM 20 MG/1
40 TABLET, DELAYED RELEASE ORAL ONCE
Refills: 0 | Status: COMPLETED | OUTPATIENT
Start: 2022-06-06 | End: 2022-06-06

## 2022-05-16 RX ORDER — CEFAZOLIN SODIUM 1 G
2000 VIAL (EA) INJECTION ONCE
Refills: 0 | Status: DISCONTINUED | OUTPATIENT
Start: 2022-06-06 | End: 2022-06-06

## 2022-05-16 RX ORDER — LIDOCAINE HCL 20 MG/ML
0.2 VIAL (ML) INJECTION ONCE
Refills: 0 | Status: DISCONTINUED | OUTPATIENT
Start: 2022-06-06 | End: 2022-06-06

## 2022-05-16 NOTE — H&P PST ADULT - FALL HARM RISK - HARM RISK INTERVENTIONS

## 2022-05-16 NOTE — H&P PST ADULT - NSICDXPASTMEDICALHX_GEN_ALL_CORE_FT
PAST MEDICAL HISTORY:  Asthma inhalers  worse with animal fur, perfumes, air conditioning      Benign hypertension     Chronic pain of left knee     Diabetes mellitus, type 2 diet management    Unilateral primary osteoarthritis, left knee

## 2022-05-16 NOTE — H&P PST ADULT - HISTORY OF PRESENT ILLNESS
56 yr old female with hx of obesity, hypertension, asthma ,BIRD, diabetes mellitus type 2and left knee pain.  Noted increase pain left knee difficulty waling work up need knee replacement.    **GOAL walk without pain, play with my grandkids     ***COVID***  denies foreign travel  denies s/s   denies known exposure  denies infection    swab 6/3 Nas   vaccine Pfizer5/27/21, 6/17/21 booster 12/28/21  56 yr old female with hx of obesity, hypertension, asthma ,BIRD, diabetes mellitus type 2 and left knee pain.  Noted increase pain left knee difficulty waling work up need knee replacement.    **GOAL walk without pain, play with my grandkids     ***COVID***  denies foreign travel  denies s/s   denies known exposure  denies infection    swab 6/3 Nas   vaccine Pfizer5/27/21, 6/17/21 booster 12/28/21

## 2022-05-16 NOTE — H&P PST ADULT - ASSESSMENT
CAPRINI SCORE [CLOT]    AGE RELATED RISK FACTORS                                                       MOBILITY RELATED FACTORS  [x ] Age 41-60 years                                            (1 Point)                  [ ] Bed rest                                                        (1 Point)  [ ] Age: 61-74 years                                           (2 Points)                 [ ] Plaster cast                                                   (2 Points)  [ ] Age= 75 years                                              (3 Points)                 [ ] Bed bound for more than 72 hours                 (2 Points)    DISEASE RELATED RISK FACTORS                                               GENDER SPECIFIC FACTORS  [ ] Edema in the lower extremities                       (1 Point)                  [ ] Pregnancy                                                     (1 Point)  [ ] Varicose veins                                               (1 Point)                  [ ] Post-partum < 6 weeks                                   (1 Point)             [x ] BMI > 25 Kg/m2                                            (1 Point)                  [ ] Hormonal therapy  or oral contraception          (1 Point)                 [ ] Sepsis (in the previous month)                        (1 Point)                  [ ] History of pregnancy complications                 (1 point)  [ ] Pneumonia or serious lung disease                                               [ ] Unexplained or recurrent                     (1 Point)           (in the previous month)                               (1 Point)  [ ] Abnormal pulmonary function test                     (1 Point)                 SURGERY RELATED RISK FACTORS  [ ] Acute myocardial infarction                              (1 Point)                 [ ]  Section                                             (1 Point)  [ ] Congestive heart failure (in the previous month)  (1 Point)               [ ] Minor surgery                                                  (1 Point)   [ ] Inflammatory bowel disease                             (1 Point)                 [ ] Arthroscopic surgery                                        (2 Points)  [ ] Central venous access                                      (2 Points)                [ ] General surgery lasting more than 45 minutes   (2 Points)       [ ] Stroke (in the previous month)                          (5 Points)               [ x] Elective arthroplasty                                         (5 Points)                                                                                                                                               HEMATOLOGY RELATED FACTORS                                                 TRAUMA RELATED RISK FACTORS  [ ] Prior episodes of VTE                                     (3 Points)                [ ] Fracture of the hip, pelvis, or leg                       (5 Points)  [ ] Positive family history for VTE                         (3 Points)                 [ ] Acute spinal cord injury (in the previous month)  (5 Points)  [ ] Prothrombin 60147 A                                     (3 Points)                 [ ] Paralysis  (less than 1 month)                             (5 Points)  [ ] Factor V Leiden                                             (3 Points)                  [ ] Multiple Trauma within 1 month                        (5 Points)  [ ] Lupus anticoagulants                                     (3 Points)                                                           [ ] Anticardiolipin antibodies                               (3 Points)                                                       [ ] High homocysteine in the blood                      (3 Points)                                             [ ] Other congenital or acquired thrombophilia      (3 Points)                                                [ ] Heparin induced thrombocytopenia                  (3 Points)                                          Total Score [     7     ]    Caprini Score 0 - 2:  Low Risk, No VTE Prophylaxis required for most patients, encourage ambulation  Caprini Score 3 - 6:  At Risk, pharmacologic VTE prophylaxis is indicated for most patients (in the absence of a contraindication)  Caprini Score Greater than or = 7:  High Risk, pharmacologic VTE prophylaxis is indicated for most patients (in the absence of a contraindication)

## 2022-06-03 ENCOUNTER — OUTPATIENT (OUTPATIENT)
Dept: OUTPATIENT SERVICES | Facility: HOSPITAL | Age: 57
LOS: 1 days | End: 2022-06-03

## 2022-06-03 DIAGNOSIS — Z11.52 ENCOUNTER FOR SCREENING FOR COVID-19: ICD-10-CM

## 2022-06-03 DIAGNOSIS — Z98.891 HISTORY OF UTERINE SCAR FROM PREVIOUS SURGERY: Chronic | ICD-10-CM

## 2022-06-03 DIAGNOSIS — Z98.890 OTHER SPECIFIED POSTPROCEDURAL STATES: Chronic | ICD-10-CM

## 2022-06-03 LAB — SARS-COV-2 RNA SPEC QL NAA+PROBE: SIGNIFICANT CHANGE UP

## 2022-06-05 ENCOUNTER — FORM ENCOUNTER (OUTPATIENT)
Age: 57
End: 2022-06-05

## 2022-06-06 ENCOUNTER — INPATIENT (INPATIENT)
Facility: HOSPITAL | Age: 57
LOS: 3 days | Discharge: HOME | DRG: 469 | End: 2022-06-10
Attending: ORTHOPAEDIC SURGERY | Admitting: ORTHOPAEDIC SURGERY
Payer: MEDICARE

## 2022-06-06 ENCOUNTER — APPOINTMENT (OUTPATIENT)
Dept: ORTHOPEDIC SURGERY | Facility: HOSPITAL | Age: 57
End: 2022-06-06

## 2022-06-06 VITALS
OXYGEN SATURATION: 95 % | DIASTOLIC BLOOD PRESSURE: 73 MMHG | HEIGHT: 66 IN | HEART RATE: 83 BPM | RESPIRATION RATE: 18 BRPM | WEIGHT: 259.93 LBS | TEMPERATURE: 98 F | SYSTOLIC BLOOD PRESSURE: 115 MMHG

## 2022-06-06 DIAGNOSIS — Z98.891 HISTORY OF UTERINE SCAR FROM PREVIOUS SURGERY: Chronic | ICD-10-CM

## 2022-06-06 DIAGNOSIS — M17.12 UNILATERAL PRIMARY OSTEOARTHRITIS, LEFT KNEE: ICD-10-CM

## 2022-06-06 DIAGNOSIS — Z98.890 OTHER SPECIFIED POSTPROCEDURAL STATES: Chronic | ICD-10-CM

## 2022-06-06 LAB
GLUCOSE BLDC GLUCOMTR-MCNC: 153 MG/DL — HIGH (ref 70–99)
GLUCOSE BLDC GLUCOMTR-MCNC: 183 MG/DL — HIGH (ref 70–99)
GLUCOSE BLDC GLUCOMTR-MCNC: 85 MG/DL — SIGNIFICANT CHANGE UP (ref 70–99)

## 2022-06-06 PROCEDURE — 20985 CPTR-ASST DIR MS PX: CPT

## 2022-06-06 PROCEDURE — 27447 TOTAL KNEE ARTHROPLASTY: CPT | Mod: LT

## 2022-06-06 PROCEDURE — S2900 ROBOTIC SURGICAL SYSTEM: CPT | Mod: NC

## 2022-06-06 PROCEDURE — 0055T BONE SRGRY CMPTR CT/MRI IMAG: CPT

## 2022-06-06 PROCEDURE — 73560 X-RAY EXAM OF KNEE 1 OR 2: CPT | Mod: 26,LT

## 2022-06-06 DEVICE — COMP FEM CR CMNTLSS BEADED W/ PA SZ 4 LT: Type: IMPLANTABLE DEVICE | Site: LEFT KNEE | Status: FUNCTIONAL

## 2022-06-06 DEVICE — INSERT TIB BEARING CS X3 SZ 4 9MM: Type: IMPLANTABLE DEVICE | Site: LEFT KNEE | Status: FUNCTIONAL

## 2022-06-06 DEVICE — PATELLA ASYMM TRIATHLON SZ A 32X10MM: Type: IMPLANTABLE DEVICE | Site: LEFT KNEE | Status: FUNCTIONAL

## 2022-06-06 DEVICE — MAKO BONE PIN 4MM X 140MM: Type: IMPLANTABLE DEVICE | Site: LEFT KNEE | Status: FUNCTIONAL

## 2022-06-06 DEVICE — BASEPLATE TIB TRIATHLON TRITAN SZ 4: Type: IMPLANTABLE DEVICE | Site: LEFT KNEE | Status: FUNCTIONAL

## 2022-06-06 DEVICE — MAKO BONE PIN 4MM X 110MM: Type: IMPLANTABLE DEVICE | Site: LEFT KNEE | Status: FUNCTIONAL

## 2022-06-06 RX ORDER — ONDANSETRON 8 MG/1
4 TABLET, FILM COATED ORAL EVERY 6 HOURS
Refills: 0 | Status: DISCONTINUED | OUTPATIENT
Start: 2022-06-06 | End: 2022-06-10

## 2022-06-06 RX ORDER — PANTOPRAZOLE SODIUM 20 MG/1
40 TABLET, DELAYED RELEASE ORAL
Refills: 0 | Status: DISCONTINUED | OUTPATIENT
Start: 2022-06-06 | End: 2022-06-10

## 2022-06-06 RX ORDER — SODIUM CHLORIDE 9 MG/ML
1000 INJECTION, SOLUTION INTRAVENOUS
Refills: 0 | Status: DISCONTINUED | OUTPATIENT
Start: 2022-06-06 | End: 2022-06-10

## 2022-06-06 RX ORDER — OXYCODONE HYDROCHLORIDE 5 MG/1
10 TABLET ORAL EVERY 4 HOURS
Refills: 0 | Status: DISCONTINUED | OUTPATIENT
Start: 2022-06-06 | End: 2022-06-07

## 2022-06-06 RX ORDER — ATORVASTATIN CALCIUM 80 MG/1
1 TABLET, FILM COATED ORAL
Qty: 0 | Refills: 0 | DISCHARGE

## 2022-06-06 RX ORDER — ACETAMINOPHEN 500 MG
1000 TABLET ORAL ONCE
Refills: 0 | Status: DISCONTINUED | OUTPATIENT
Start: 2022-06-06 | End: 2022-06-10

## 2022-06-06 RX ORDER — DEXTROSE 50 % IN WATER 50 %
12.5 SYRINGE (ML) INTRAVENOUS ONCE
Refills: 0 | Status: DISCONTINUED | OUTPATIENT
Start: 2022-06-06 | End: 2022-06-10

## 2022-06-06 RX ORDER — ATORVASTATIN CALCIUM 80 MG/1
20 TABLET, FILM COATED ORAL AT BEDTIME
Refills: 0 | Status: DISCONTINUED | OUTPATIENT
Start: 2022-06-06 | End: 2022-06-10

## 2022-06-06 RX ORDER — BUDESONIDE AND FORMOTEROL FUMARATE DIHYDRATE 160; 4.5 UG/1; UG/1
2 AEROSOL RESPIRATORY (INHALATION)
Refills: 0 | Status: DISCONTINUED | OUTPATIENT
Start: 2022-06-06 | End: 2022-06-08

## 2022-06-06 RX ORDER — ALBUTEROL 90 UG/1
2 AEROSOL, METERED ORAL
Qty: 0 | Refills: 0 | DISCHARGE

## 2022-06-06 RX ORDER — ASPIRIN/CALCIUM CARB/MAGNESIUM 324 MG
325 TABLET ORAL
Refills: 0 | Status: DISCONTINUED | OUTPATIENT
Start: 2022-06-06 | End: 2022-06-10

## 2022-06-06 RX ORDER — OXYCODONE HYDROCHLORIDE 5 MG/1
5 TABLET ORAL EVERY 4 HOURS
Refills: 0 | Status: DISCONTINUED | OUTPATIENT
Start: 2022-06-06 | End: 2022-06-07

## 2022-06-06 RX ORDER — DEXTROSE 50 % IN WATER 50 %
15 SYRINGE (ML) INTRAVENOUS ONCE
Refills: 0 | Status: DISCONTINUED | OUTPATIENT
Start: 2022-06-06 | End: 2022-06-10

## 2022-06-06 RX ORDER — TRAMADOL HYDROCHLORIDE 50 MG/1
50 TABLET ORAL EVERY 6 HOURS
Refills: 0 | Status: DISCONTINUED | OUTPATIENT
Start: 2022-06-06 | End: 2022-06-07

## 2022-06-06 RX ORDER — GLUCAGON INJECTION, SOLUTION 0.5 MG/.1ML
1 INJECTION, SOLUTION SUBCUTANEOUS ONCE
Refills: 0 | Status: DISCONTINUED | OUTPATIENT
Start: 2022-06-06 | End: 2022-06-10

## 2022-06-06 RX ORDER — INSULIN LISPRO 100/ML
VIAL (ML) SUBCUTANEOUS AT BEDTIME
Refills: 0 | Status: DISCONTINUED | OUTPATIENT
Start: 2022-06-06 | End: 2022-06-10

## 2022-06-06 RX ORDER — INSULIN LISPRO 100/ML
VIAL (ML) SUBCUTANEOUS
Refills: 0 | Status: DISCONTINUED | OUTPATIENT
Start: 2022-06-06 | End: 2022-06-10

## 2022-06-06 RX ORDER — DEXTROSE 50 % IN WATER 50 %
25 SYRINGE (ML) INTRAVENOUS ONCE
Refills: 0 | Status: DISCONTINUED | OUTPATIENT
Start: 2022-06-06 | End: 2022-06-10

## 2022-06-06 RX ORDER — VANCOMYCIN HCL 1 G
1750 VIAL (EA) INTRAVENOUS ONCE
Refills: 0 | Status: COMPLETED | OUTPATIENT
Start: 2022-06-07 | End: 2022-06-07

## 2022-06-06 RX ORDER — MONTELUKAST 4 MG/1
10 TABLET, CHEWABLE ORAL DAILY
Refills: 0 | Status: DISCONTINUED | OUTPATIENT
Start: 2022-06-06 | End: 2022-06-10

## 2022-06-06 RX ORDER — KETOROLAC TROMETHAMINE 30 MG/ML
15 SYRINGE (ML) INJECTION EVERY 6 HOURS
Refills: 0 | Status: DISCONTINUED | OUTPATIENT
Start: 2022-06-06 | End: 2022-06-07

## 2022-06-06 RX ORDER — MONTELUKAST 4 MG/1
1 TABLET, CHEWABLE ORAL
Qty: 0 | Refills: 0 | DISCHARGE

## 2022-06-06 RX ORDER — ONDANSETRON 8 MG/1
4 TABLET, FILM COATED ORAL ONCE
Refills: 0 | Status: DISCONTINUED | OUTPATIENT
Start: 2022-06-06 | End: 2022-06-06

## 2022-06-06 RX ORDER — MELOXICAM 15 MG/1
1 TABLET ORAL
Qty: 0 | Refills: 0 | DISCHARGE

## 2022-06-06 RX ORDER — AMLODIPINE BESYLATE 2.5 MG/1
1 TABLET ORAL
Qty: 0 | Refills: 0 | DISCHARGE

## 2022-06-06 RX ORDER — INDAPAMIDE 1.25 MG
1 TABLET ORAL
Qty: 0 | Refills: 0 | DISCHARGE

## 2022-06-06 RX ORDER — MAGNESIUM HYDROXIDE 400 MG/1
30 TABLET, CHEWABLE ORAL DAILY
Refills: 0 | Status: DISCONTINUED | OUTPATIENT
Start: 2022-06-06 | End: 2022-06-10

## 2022-06-06 RX ORDER — SODIUM CHLORIDE 9 MG/ML
500 INJECTION, SOLUTION INTRAVENOUS ONCE
Refills: 0 | Status: COMPLETED | OUTPATIENT
Start: 2022-06-06 | End: 2022-06-06

## 2022-06-06 RX ORDER — AMLODIPINE BESYLATE 2.5 MG/1
10 TABLET ORAL DAILY
Refills: 0 | Status: DISCONTINUED | OUTPATIENT
Start: 2022-06-06 | End: 2022-06-10

## 2022-06-06 RX ORDER — INDAPAMIDE 1.25 MG
2.5 TABLET ORAL DAILY
Refills: 0 | Status: DISCONTINUED | OUTPATIENT
Start: 2022-06-06 | End: 2022-06-10

## 2022-06-06 RX ORDER — CHLORHEXIDINE GLUCONATE 213 G/1000ML
1 SOLUTION TOPICAL ONCE
Refills: 0 | Status: COMPLETED | OUTPATIENT
Start: 2022-06-06 | End: 2022-06-06

## 2022-06-06 RX ORDER — POLYETHYLENE GLYCOL 3350 17 G/17G
17 POWDER, FOR SOLUTION ORAL AT BEDTIME
Refills: 0 | Status: DISCONTINUED | OUTPATIENT
Start: 2022-06-06 | End: 2022-06-10

## 2022-06-06 RX ORDER — ALBUTEROL 90 UG/1
2 AEROSOL, METERED ORAL EVERY 6 HOURS
Refills: 0 | Status: DISCONTINUED | OUTPATIENT
Start: 2022-06-06 | End: 2022-06-10

## 2022-06-06 RX ORDER — HYDROMORPHONE HYDROCHLORIDE 2 MG/ML
0.25 INJECTION INTRAMUSCULAR; INTRAVENOUS; SUBCUTANEOUS
Refills: 0 | Status: DISCONTINUED | OUTPATIENT
Start: 2022-06-06 | End: 2022-06-06

## 2022-06-06 RX ORDER — SENNA PLUS 8.6 MG/1
2 TABLET ORAL AT BEDTIME
Refills: 0 | Status: DISCONTINUED | OUTPATIENT
Start: 2022-06-06 | End: 2022-06-10

## 2022-06-06 RX ORDER — LOSARTAN POTASSIUM 100 MG/1
1 TABLET, FILM COATED ORAL
Qty: 0 | Refills: 0 | DISCHARGE

## 2022-06-06 RX ORDER — SODIUM CHLORIDE 9 MG/ML
1000 INJECTION, SOLUTION INTRAVENOUS
Refills: 0 | Status: DISCONTINUED | OUTPATIENT
Start: 2022-06-06 | End: 2022-06-07

## 2022-06-06 RX ORDER — VANCOMYCIN HCL 1 G
1750 VIAL (EA) INTRAVENOUS ONCE
Refills: 0 | Status: COMPLETED | OUTPATIENT
Start: 2022-06-06 | End: 2022-06-06

## 2022-06-06 RX ORDER — FLUTICASONE PROPIONATE AND SALMETEROL 50; 250 UG/1; UG/1
1 POWDER ORAL; RESPIRATORY (INHALATION)
Qty: 0 | Refills: 0 | DISCHARGE

## 2022-06-06 RX ORDER — SODIUM CHLORIDE 9 MG/ML
500 INJECTION, SOLUTION INTRAVENOUS ONCE
Refills: 0 | Status: COMPLETED | OUTPATIENT
Start: 2022-06-06 | End: 2022-06-07

## 2022-06-06 RX ORDER — LOSARTAN POTASSIUM 100 MG/1
100 TABLET, FILM COATED ORAL DAILY
Refills: 0 | Status: DISCONTINUED | OUTPATIENT
Start: 2022-06-06 | End: 2022-06-10

## 2022-06-06 RX ORDER — ACETAMINOPHEN 500 MG
975 TABLET ORAL EVERY 8 HOURS
Refills: 0 | Status: DISCONTINUED | OUTPATIENT
Start: 2022-06-07 | End: 2022-06-07

## 2022-06-06 RX ORDER — ACETAMINOPHEN 500 MG
1000 TABLET ORAL ONCE
Refills: 0 | Status: COMPLETED | OUTPATIENT
Start: 2022-06-07 | End: 2022-06-07

## 2022-06-06 RX ORDER — HYDROMORPHONE HYDROCHLORIDE 2 MG/ML
0.5 INJECTION INTRAMUSCULAR; INTRAVENOUS; SUBCUTANEOUS ONCE
Refills: 0 | Status: DISCONTINUED | OUTPATIENT
Start: 2022-06-06 | End: 2022-06-07

## 2022-06-06 RX ORDER — CELECOXIB 200 MG/1
200 CAPSULE ORAL EVERY 12 HOURS
Refills: 0 | Status: DISCONTINUED | OUTPATIENT
Start: 2022-06-07 | End: 2022-06-10

## 2022-06-06 RX ADMIN — TRAMADOL HYDROCHLORIDE 50 MILLIGRAM(S): 50 TABLET ORAL at 15:59

## 2022-06-06 RX ADMIN — PANTOPRAZOLE SODIUM 40 MILLIGRAM(S): 20 TABLET, DELAYED RELEASE ORAL at 15:59

## 2022-06-06 RX ADMIN — HYDROMORPHONE HYDROCHLORIDE 0.25 MILLIGRAM(S): 2 INJECTION INTRAMUSCULAR; INTRAVENOUS; SUBCUTANEOUS at 20:45

## 2022-06-06 RX ADMIN — CHLORHEXIDINE GLUCONATE 1 APPLICATION(S): 213 SOLUTION TOPICAL at 13:53

## 2022-06-06 RX ADMIN — SODIUM CHLORIDE 500 MILLILITER(S): 9 INJECTION, SOLUTION INTRAVENOUS at 22:51

## 2022-06-06 RX ADMIN — HYDROMORPHONE HYDROCHLORIDE 0.25 MILLIGRAM(S): 2 INJECTION INTRAMUSCULAR; INTRAVENOUS; SUBCUTANEOUS at 20:30

## 2022-06-06 RX ADMIN — Medication 250 MILLIGRAM(S): at 16:48

## 2022-06-06 RX ADMIN — HYDROMORPHONE HYDROCHLORIDE 0.25 MILLIGRAM(S): 2 INJECTION INTRAMUSCULAR; INTRAVENOUS; SUBCUTANEOUS at 22:10

## 2022-06-06 RX ADMIN — Medication 150 MILLIGRAM(S): at 15:59

## 2022-06-06 RX ADMIN — SODIUM CHLORIDE 500 MILLILITER(S): 9 INJECTION, SOLUTION INTRAVENOUS at 20:02

## 2022-06-06 RX ADMIN — ATORVASTATIN CALCIUM 20 MILLIGRAM(S): 80 TABLET, FILM COATED ORAL at 22:31

## 2022-06-06 RX ADMIN — ONDANSETRON 4 MILLIGRAM(S): 8 TABLET, FILM COATED ORAL at 22:49

## 2022-06-06 RX ADMIN — Medication 15 MILLIGRAM(S): at 23:22

## 2022-06-06 RX ADMIN — HYDROMORPHONE HYDROCHLORIDE 0.25 MILLIGRAM(S): 2 INJECTION INTRAMUSCULAR; INTRAVENOUS; SUBCUTANEOUS at 21:55

## 2022-06-06 NOTE — PRE-OP CHECKLIST - LATEX ALLERGY
Gastroenterology Clinic Follow up Visit    Edwardo Emery  [id number]  Chief Complaint   Patient presents with    Follow-up     Patient was supposed to have a colonoscopy. Was in the hospital 3/15 with iron deficiency. Denies any dark or bloody stools. HPI and A/P at last visit summarized below: This was a video encounter and the patient was informed that he is going to be billed for this service. Patient was admitted with anemia and passing dark stool. An upper endoscopy was done which was unremarkable. Colonoscopy was planned but because of his respiratory status it was postponed. And had respiratory issues which required endotracheal intubation and he was in ICU. Did not have any further GI bleeding and because of his worsening respiratory status the procedure was canceled, he was discharged home with GI follow-up as an outpatient, ports no abdominal pain or having any GI bleeding. Stool is normal color, no change in bowel habits. He is not on any blood thinners, does not take any NSAIDs. Gets short of breath on exertion. His most recent CBC done on March 24 shows a hemoglobin of 7.5 and hematocrit of 23.4, renal functions are normal    Review of Systems   Constitutional: Negative. HENT: Negative. Eyes: Negative. Respiratory: Positive for shortness of breath. Gastrointestinal: Negative. Negative for abdominal distention, abdominal pain, anal bleeding, blood in stool, constipation, diarrhea, nausea, rectal pain and vomiting. Genitourinary: Negative. Musculoskeletal: Negative. Neurological: Negative. Psychiatric/Behavioral: Negative. Past medical history, past surgical history, medication list, social and familyhistory reviewed    There were no vitals taken for this visit.     Physical Exam    Laboratory, Pathology, Radiology reviewed in detail with relevantimportant investigations summarized below:    Recent Labs     03/24/20  0521 03/23/20  0518 03/22/20  4026 of the stomach. No aortic aneurysm or dissection. Atherosclerotic calcification of the thoracic aorta. No filling defect within the pulmonary arteries to suggest pulmonary embolism. Heart size is within normal limits. No significant pericardial effusion. Esophagus is within normal limits. Moderate bilateral pleural effusions with adjacent consolidations. Patchy groundglass and nodular opacities throughout both lungs. No pneumothorax. Acute minimally displaced fracture of right ribs 4 anteriorly. Degenerative changes of the spine. ABDOMEN/PELVIS: Well-circumscribed tiny hypodensities of the liver most likely related to hepatic cysts. The liver is otherwise unremarkable. The gallbladder, spleen, stomach, pancreas, and adrenal glands are within normal limits. The kidneys enhance uniformly. 3 mm left renal calculus versus renal vascular calcification. No right-sided urinary tract calculi. No hydronephrosis. Urinary bladder is decompressed around a Brice catheter, findings which are poorly evaluated secondary to streak artifact from left total hip arthroplasty prosthesis. Abdominal aorta is nonaneurysmal and demonstrates atherosclerotic calcification. No retroperitoneal or abdominal/pelvic lymphadenopathy. No small bowel obstruction. Colonic diverticuli are identified, most significantly involving the distal descending colon. No overt colonic mass or pericolonic inflammation. Appendix is within normal limits. No free fluid or free air. Degenerative changes of the spine. Postsurgical changes of lumbar spine fusion with posterior decompression at L3-L5. There is sclerosis of the superior humeral head compatible with avascular necrosis without collapse. No pulmonary embolism. Moderate bilateral pleural effusions with adjacent consolidations. Groundglass and nodular densities throughout both lungs may be infectious/ inflammatory in etiology or due to pulmonary edema.  Acute minimally displaced fracture of anterior right yes

## 2022-06-06 NOTE — CHART NOTE - NSCHARTNOTEFT_GEN_A_CORE
POC    Resting without complaints.  Daughter @ bedside  Block/Anesthesia still in effect    No Chest Pain, SOB, N/V.    T(C): 36 (06-06-22 @ 20:00), Max: 36.9 (06-06-22 @ 13:54)  HR: 79 (06-06-22 @ 20:45) (66 - 83)  BP: 110/59 (06-06-22 @ 20:45) (96/53 - 115/73)  RR: 16 (06-06-22 @ 20:45) (15 - 18)  SpO2: 95% (06-06-22 @ 20:45) (94% - 99%)  Wt(kg): --    Exam:  sleepy but arousable, No Acute Distress  Card: +S1/S2, RRR  Pulm: CTAB  Abdomen soft / benign  Gaming  [x ]   EXT   LLE       Aquacel dressing C/D [x ]        Calves soft       (+) Decreased motor and sensory 2/2 anesthesia / block           digits: warm / well-perfused         cap refill brisk @ 2-3 secs         2+ pulses    Xray:---- prosthesis in good alignment     A/P: S/p Left total knee arthroplasty    -serial n/v checks  -PT/OT-WBAT-  -Chk AM Labs  -DVT PPx: ASA BID  -Pain Control PO/IV Pain Rx  -Continue Current Tx      Extended stay in PACU 2/2 h/o BIRD (non-compliant)  -Dispo planning:  TBD        ***See Above  Robbie BURNS  Orthopedics  B: [phone]  S: [phone]

## 2022-06-07 ENCOUNTER — TRANSCRIPTION ENCOUNTER (OUTPATIENT)
Age: 57
End: 2022-06-07

## 2022-06-07 LAB
ANION GAP SERPL CALC-SCNC: 10 MMOL/L — SIGNIFICANT CHANGE UP (ref 5–17)
BUN SERPL-MCNC: 13 MG/DL — SIGNIFICANT CHANGE UP (ref 7–23)
CALCIUM SERPL-MCNC: 9.1 MG/DL — SIGNIFICANT CHANGE UP (ref 8.4–10.5)
CHLORIDE SERPL-SCNC: 97 MMOL/L — SIGNIFICANT CHANGE UP (ref 96–108)
CO2 SERPL-SCNC: 28 MMOL/L — SIGNIFICANT CHANGE UP (ref 22–31)
CREAT SERPL-MCNC: 0.67 MG/DL — SIGNIFICANT CHANGE UP (ref 0.5–1.3)
EGFR: 103 ML/MIN/1.73M2 — SIGNIFICANT CHANGE UP
GLUCOSE BLDC GLUCOMTR-MCNC: 141 MG/DL — HIGH (ref 70–99)
GLUCOSE BLDC GLUCOMTR-MCNC: 166 MG/DL — HIGH (ref 70–99)
GLUCOSE SERPL-MCNC: 144 MG/DL — HIGH (ref 70–99)
HCT VFR BLD CALC: 35.4 % — SIGNIFICANT CHANGE UP (ref 34.5–45)
HGB BLD-MCNC: 11 G/DL — LOW (ref 11.5–15.5)
MCHC RBC-ENTMCNC: 25 PG — LOW (ref 27–34)
MCHC RBC-ENTMCNC: 31.1 GM/DL — LOW (ref 32–36)
MCV RBC AUTO: 80.5 FL — SIGNIFICANT CHANGE UP (ref 80–100)
NRBC # BLD: 0 /100 WBCS — SIGNIFICANT CHANGE UP (ref 0–0)
PLATELET # BLD AUTO: 253 K/UL — SIGNIFICANT CHANGE UP (ref 150–400)
POTASSIUM SERPL-MCNC: 4.1 MMOL/L — SIGNIFICANT CHANGE UP (ref 3.5–5.3)
POTASSIUM SERPL-SCNC: 4.1 MMOL/L — SIGNIFICANT CHANGE UP (ref 3.5–5.3)
RBC # BLD: 4.4 M/UL — SIGNIFICANT CHANGE UP (ref 3.8–5.2)
RBC # FLD: 14.6 % — HIGH (ref 10.3–14.5)
SODIUM SERPL-SCNC: 135 MMOL/L — SIGNIFICANT CHANGE UP (ref 135–145)
WBC # BLD: 8.41 K/UL — SIGNIFICANT CHANGE UP (ref 3.8–10.5)
WBC # FLD AUTO: 8.41 K/UL — SIGNIFICANT CHANGE UP (ref 3.8–10.5)

## 2022-06-07 RX ORDER — ACETAMINOPHEN 500 MG
1000 TABLET ORAL ONCE
Refills: 0 | Status: COMPLETED | OUTPATIENT
Start: 2022-06-07 | End: 2022-06-07

## 2022-06-07 RX ORDER — IPRATROPIUM/ALBUTEROL SULFATE 18-103MCG
3 AEROSOL WITH ADAPTER (GRAM) INHALATION EVERY 6 HOURS
Refills: 0 | Status: DISCONTINUED | OUTPATIENT
Start: 2022-06-07 | End: 2022-06-10

## 2022-06-07 RX ORDER — SODIUM CHLORIDE 9 MG/ML
1000 INJECTION INTRAMUSCULAR; INTRAVENOUS; SUBCUTANEOUS
Refills: 0 | Status: DISCONTINUED | OUTPATIENT
Start: 2022-06-07 | End: 2022-06-08

## 2022-06-07 RX ADMIN — AMLODIPINE BESYLATE 10 MILLIGRAM(S): 2.5 TABLET ORAL at 09:40

## 2022-06-07 RX ADMIN — Medication 1: at 09:05

## 2022-06-07 RX ADMIN — Medication 3 MILLILITER(S): at 23:14

## 2022-06-07 RX ADMIN — Medication 250 MILLIGRAM(S): at 05:52

## 2022-06-07 RX ADMIN — Medication 15 MILLIGRAM(S): at 06:50

## 2022-06-07 RX ADMIN — BUDESONIDE AND FORMOTEROL FUMARATE DIHYDRATE 2 PUFF(S): 160; 4.5 AEROSOL RESPIRATORY (INHALATION) at 17:23

## 2022-06-07 RX ADMIN — Medication 400 MILLIGRAM(S): at 10:30

## 2022-06-07 RX ADMIN — Medication 1000 MILLIGRAM(S): at 10:36

## 2022-06-07 RX ADMIN — Medication 15 MILLIGRAM(S): at 12:36

## 2022-06-07 RX ADMIN — PANTOPRAZOLE SODIUM 40 MILLIGRAM(S): 20 TABLET, DELAYED RELEASE ORAL at 05:52

## 2022-06-07 RX ADMIN — Medication 325 MILLIGRAM(S): at 05:52

## 2022-06-07 RX ADMIN — Medication 3 MILLILITER(S): at 17:24

## 2022-06-07 RX ADMIN — Medication 15 MILLIGRAM(S): at 00:22

## 2022-06-07 RX ADMIN — ATORVASTATIN CALCIUM 20 MILLIGRAM(S): 80 TABLET, FILM COATED ORAL at 21:32

## 2022-06-07 RX ADMIN — Medication 1: at 17:44

## 2022-06-07 RX ADMIN — Medication 400 MILLIGRAM(S): at 02:22

## 2022-06-07 RX ADMIN — Medication 3 MILLILITER(S): at 10:41

## 2022-06-07 RX ADMIN — SODIUM CHLORIDE 500 MILLILITER(S): 9 INJECTION, SOLUTION INTRAVENOUS at 05:54

## 2022-06-07 RX ADMIN — MONTELUKAST 10 MILLIGRAM(S): 4 TABLET, CHEWABLE ORAL at 12:10

## 2022-06-07 RX ADMIN — Medication 1000 MILLIGRAM(S): at 22:33

## 2022-06-07 RX ADMIN — Medication 400 MILLIGRAM(S): at 16:08

## 2022-06-07 RX ADMIN — Medication 1000 MILLIGRAM(S): at 16:46

## 2022-06-07 RX ADMIN — Medication 15 MILLIGRAM(S): at 12:10

## 2022-06-07 RX ADMIN — Medication 325 MILLIGRAM(S): at 16:08

## 2022-06-07 RX ADMIN — BUDESONIDE AND FORMOTEROL FUMARATE DIHYDRATE 2 PUFF(S): 160; 4.5 AEROSOL RESPIRATORY (INHALATION) at 05:53

## 2022-06-07 RX ADMIN — Medication 15 MILLIGRAM(S): at 18:26

## 2022-06-07 RX ADMIN — Medication 15 MILLIGRAM(S): at 05:52

## 2022-06-07 RX ADMIN — Medication 15 MILLIGRAM(S): at 17:24

## 2022-06-07 RX ADMIN — Medication 400 MILLIGRAM(S): at 21:33

## 2022-06-07 RX ADMIN — Medication 1000 MILLIGRAM(S): at 03:22

## 2022-06-07 RX ADMIN — ONDANSETRON 4 MILLIGRAM(S): 8 TABLET, FILM COATED ORAL at 09:33

## 2022-06-07 RX ADMIN — SENNA PLUS 2 TABLET(S): 8.6 TABLET ORAL at 21:33

## 2022-06-07 NOTE — DISCHARGE NOTE PROVIDER - HOSPITAL COURSE
**GOAL walk without pain, play with my grandkids     This is a 56 year old Female admitted to [facility] on 6/6/22 for an elective total knee arthroplasty.  Surgery was uncomplicated.  Evaluated and treated by PT, recommended for home.  Remain of hospital stay unremarkable, and patient discharged home when PT cleared. **GOAL walk without pain, play with my grandkids     This is a 56 year old Female with advanced osteoarthritis of the left knee, admitted to [facility] on 22 for an elective left total knee arthroplasty with Dr. Deluna.      PAST MEDICAL HISTORY:  Asthma (worse with animal fur, perfumes, air conditioning)  BIRD - noncompliant with CPAP  Benign hypertension   Diabetes mellitus, type 2 diet management    PAST SURGICAL HISTORY:  H/O right wrist surgery many years ago  H/O:      Hospital Course:   Patient underwent left total knee replacement by LEONARD robotic assist with Dr. Deluna on 22. Patient tolerated the procedure well, without operative complications.   Typical Physical & occupational therapy modalities post surgery were performed including ambulation training, range of motion, ADL's, abd transfers.  After progression of mobility guided by the PT/ OT staff,  and the patient has been cleared for discharge to home with outpatient PT.    Discharge and Orthopedic Care instructions were delineated in the Discharge Plan and reviewed with the patient.   All medications were delineated in the medication reconciliation tool and key points were reviewed with the patient.   22: Patient continued to require 3L supplemental oxygen. Pulmonology was consulted ******      They were deemed stable from an Orthopedic & medical standpoint for discharge today. **GOAL walk without pain, play with my grandkids     This is a 56 year old Female with advanced osteoarthritis of the left knee, admitted to [facility] on 22 for an elective left total knee arthroplasty with Dr. Deluna.      PAST MEDICAL HISTORY:  Asthma (worse with animal fur, perfumes, air conditioning)  BIRD - noncompliant with CPAP  Benign hypertension   Diabetes mellitus, type 2 diet management    PAST SURGICAL HISTORY:  H/O right wrist surgery many years ago  H/O:      Hospital Course:   Patient underwent left total knee replacement by LEONARD robotic assist with Dr. Deluna on 22. Patient tolerated the procedure well, without operative complications.   Typical Physical & occupational therapy modalities post surgery were performed including ambulation training, range of motion, ADL's, abd transfers.  After progression of mobility guided by the PT/ OT staff,  and the patient has been cleared for discharge to home with outpatient PT.    Discharge and Orthopedic Care instructions were delineated in the Discharge Plan and reviewed with the patient.   All medications were delineated in the medication reconciliation tool and key points were reviewed with the patient.   22: Patient continued to require 3L supplemental oxygen. Pulmonology was consulted and recommendations followed.  Patient advised to begin regular visits with Endocrinology for diabetes management; educated on importance of tight glycemic control..       They were deemed stable from an Orthopedic & medical standpoint for discharge today. **GOAL walk without pain, play with my grandkids     This is a 56 year old Female with advanced osteoarthritis of the left knee, admitted to [facility] on 22 for an elective left total knee arthroplasty with Dr. Deluna.      PAST MEDICAL HISTORY:  Asthma (worse with animal fur, perfumes, air conditioning)  BIRD - noncompliant with CPAP  Benign hypertension   Diabetes mellitus, type 2 diet management    PAST SURGICAL HISTORY:  H/O right wrist surgery many years ago  H/O:      Hospital Course:   Patient underwent left total knee replacement by LEONARD robotic assist with Dr. Deluna on 22. Patient tolerated the procedure well, without operative complications.   Typical Physical & occupational therapy modalities post surgery were performed including ambulation training, range of motion, ADL's, abd transfers.  After progression of mobility guided by the PT/ OT staff,  and the patient has been cleared for discharge to home with outpatient PT.    Discharge and Orthopedic Care instructions were delineated in the Discharge Plan and reviewed with the patient.   All medications were delineated in the medication reconciliation tool and key points were reviewed with the patient.   22: Patient continued to require 3L supplemental oxygen. Pulmonology was consulted and recommendations followed.  Patient advised to begin regular visits with Endocrinology for diabetes management; educated on importance of tight glycemic control.      They were deemed stable from an Orthopedic & medical standpoint for discharge today.

## 2022-06-07 NOTE — PHYSICAL THERAPY INITIAL EVALUATION ADULT - PERTINENT HX OF CURRENT PROBLEM, REHAB EVAL
56 yr old female with hx of obesity, hypertension, asthma ,BIRD, diabetes mellitus type 2 and left knee pain. CT Lt knee, OA.

## 2022-06-07 NOTE — OCCUPATIONAL THERAPY INITIAL EVALUATION ADULT - SHORT TERM MEMORY, REHAB EVAL
Speech Treatment Note    Today's date: 2021  Patient name: Nilton Fletcher  : 2016  MRN: [id number]  Referring provider: Higinio Haas MD  Dx:   Encounter Diagnosis     ICD-10-CM    1  Speech delay  F80 9    2  Communication disorder  F80 9    3  Language delay  F80 1        Start Time: 1100  Stop Time: 1145  Total time in clinic (min): 45 minutes    Visit Number: 22    Subjective/Behavioral: Tran Mckeon arrived with his mom who was not present during the session  He required prompting to exit the car  He did well washing his hands and entering the therapy room but then laid on the floor instead of sitting at the table  He was able to complete all tasks but did requiring prompting when transitioning between activities if he did not want to do that activity  Objective:    Wes produced phrases today but they were unintelligible  He followed 1-step directions during activities with 40% accuracy, increasing his accuracy minimally when given moderate cues  Tran Mckeon requested using 1-word utterances during repetitive activities with 100% accuracy  He requested using 2-word utterances in 4/4 opportunities  He used 2-word utterances when labeling and talking in 7/8 opportunities and modeled 2-word utterances in 7/9 opportunities  He labeled a variety of items when looking at picture cards with 65% accuracy  Therapist and Tran Mckeon looked at a book and worked on [employer]  Assessment:     Wes modeled sounds in isolation  He was able to produce /b, p, m, w, h, n, d, k, g/ given a model and was able to produce /f/ in a word  He was unable to accurately produce /t/  Tran Mckeon produced CV words when given a model and presented with a picture card with 57% accuracy due to vowel errors and errors for /t/  Tran Mckeon produced CVCV words when given a model and presented with a picture card with 50% accuracy  He produced errors or omitted the last CV combination       Other:Discussed session and patient progress with caregiver/family member after today's session    Recommendations: continue with Plan of Care intact

## 2022-06-07 NOTE — DISCHARGE NOTE PROVIDER - NSDCMRMEDTOKEN_GEN_ALL_CORE_FT
Advair Diskus 100 mcg-50 mcg inhalation powder: 1 puff(s) inhaled 2 times a day  Albuterol (Eqv-Proventil HFA) 90 mcg/inh inhalation aerosol: 2 puff(s) inhaled every 6 hours, As Needed  amLODIPine 10 mg oral tablet: 1 tab(s) orally once a day  atorvastatin 20 mg oral tablet: 1 tab(s) orally once a day  indapamide 2.5 mg oral tablet: 1 tab(s) orally once a day (in the morning)  losartan 100 mg oral tablet: 1 tab(s) orally once a day  meloxicam 15 mg oral tablet: 1 tab(s) orally once a day  montelukast 10 mg oral tablet: 1 tab(s) orally once a day   Advair Diskus 100 mcg-50 mcg inhalation powder: 1 puff(s) inhaled 2 times a day  Albuterol (Eqv-Proventil HFA) 90 mcg/inh inhalation aerosol: 2 puff(s) inhaled every 6 hours, As Needed  amLODIPine 10 mg oral tablet: 1 tab(s) orally once a day  atorvastatin 20 mg oral tablet: 1 tab(s) orally once a day  indapamide 2.5 mg oral tablet: 1 tab(s) orally once a day (in the morning)  losartan 100 mg oral tablet: 1 tab(s) orally once a day  meloxicam 15 mg oral tablet: 1 tab(s) orally once a day  montelukast 10 mg oral tablet: 1 tab(s) orally once a day  Rolling Walker: Dx: Left total knee replacement        IVAN: 99 months   acetaminophen 325 mg oral tablet: 3 tab(s) orally every 6 hours x 1 week    (over-the-counter)  Advair Diskus 100 mcg-50 mcg inhalation powder: 1 puff(s) inhaled 2 times a day  Albuterol (Eqv-Proventil HFA) 90 mcg/inh inhalation aerosol: 2 puff(s) inhaled every 6 hours, As Needed  amLODIPine 10 mg oral tablet: 1 tab(s) orally once a day  aspirin 325 mg oral delayed release tablet: 1 tab(s) orally 2 times a day x 6 weeks for the prevention of clots  atorvastatin 20 mg oral tablet: 1 tab(s) orally once a day  indapamide 2.5 mg oral tablet: 1 tab(s) orally once a day (in the morning)  levoFLOXacin 500 mg oral tablet: 1 tab(s) orally every 24 hours x 4 days  losartan 100 mg oral tablet: 1 tab(s) orally once a day  meloxicam 15 mg oral tablet: 1 tab(s) orally once a day  montelukast 10 mg oral tablet: 1 tab(s) orally once a day  polyethylene glycol 3350 oral powder for reconstitution: 17 gram(s) orally once a day (at bedtime)  predniSONE 50 mg oral tablet: 1 tab(s) orally once a day x 2 days (6/11 and 6/12)  Rolling Walker: Dx: Left total knee replacement        IVAN: 99 months  senna oral tablet: 2 tab(s) orally once a day (at bedtime)  traMADol 50 mg oral tablet: 1 tab(s) orally 3 times a day, As needed, Moderate Pain (4 - 6)   acetaminophen 325 mg oral tablet: 3 tab(s) orally every 6 hours x 1 week    (over-the-counter)  Advair Diskus 100 mcg-50 mcg inhalation powder: 1 puff(s) inhaled 2 times a day  Albuterol (Eqv-Proventil HFA) 90 mcg/inh inhalation aerosol: 2 puff(s) inhaled every 6 hours, As Needed  amLODIPine 10 mg oral tablet: 1 tab(s) orally once a day  aspirin 325 mg oral delayed release tablet: 1 tab(s) orally 2 times a day x 6 weeks for the prevention of clots  atorvastatin 20 mg oral tablet: 1 tab(s) orally once a day  indapamide 2.5 mg oral tablet: 1 tab(s) orally once a day (in the morning)  levoFLOXacin 500 mg oral tablet: 1 tab(s) orally every 24 hours x 4 days  losartan 100 mg oral tablet: 1 tab(s) orally once a day  meloxicam 15 mg oral tablet: 1 tab(s) orally once a day  montelukast 10 mg oral tablet: 1 tab(s) orally once a day  polyethylene glycol 3350 oral powder for reconstitution: 17 gram(s) orally once a day (at bedtime)  predniSONE 50 mg oral tablet: 1 tab(s) orally once a day x 2 days (6/11 and 6/12)  Rolling Walker: Dx: Left total knee replacement        IVAN: 99 months  senna oral tablet: 2 tab(s) orally once a day (at bedtime)  traMADol 50 mg oral tablet: 1 tab(s) orally 3 times a day, As needed, Moderate Pain (4 - 6) MDD:3

## 2022-06-07 NOTE — DISCHARGE NOTE PROVIDER - NSFOLLOWUPCLINICS_GEN_ALL_ED_FT
[facility]  Endocrinology  [address] [place] [address]  Phone: [phone]  Fax:

## 2022-06-07 NOTE — DISCHARGE NOTE NURSING/CASE MANAGEMENT/SOCIAL WORK - PATIENT PORTAL LINK FT
You can access the FollowMyHealth Patient Portal offered by [facility] by registering at the following website: [email or web address]. By joining [email or web address] FollowMyHealth portal, you will also be able to view your health information using other applications (apps) compatible with our system.

## 2022-06-07 NOTE — DISCHARGE NOTE NURSING/CASE MANAGEMENT/SOCIAL WORK - FLU SEASON?
Physical Therapy Daily Treatment     Visit Count: 4/12   Plan of Care Dates: Initial: 1/9/2018 Through: 3/6/2018     Insurance Information:   WC-TRAVELERS/[id number]   ID#: <NONE>  Grp#: N/A      CLAIM FOR HIP INJURY WAS CLOSED ON 8/3/17      Secondary Benefits  [facility] CHOICE [id number]  ID#: [id number]  Eff Date: 7/1/17 (7/1 - 6/30 plan year)     Visit Limit: based on medical necessity  Auth required: No     Co-pay: $15  Ded: $750 (fam) - $750 met  Pays at: 90%}  OOP: $2000 (fam) - $1939.51 met  *-*Please show medical necessity at each visit after the 25th visit incase of a medical review     Next Referring Provider Visit: 1/16/2018     Referred by: Kerwin Abbott DO  Medical Diagnosis (from order):  Left hip pain [M25.552]  Treatment Diagnosis: Hip Symptoms with Pain  Insurance: 1. WC-TRAVELERS  2. N/A     Date of onset/injury: 10/9/2017     Diagnosis Precautions: none  Chart reviewed: Relevant co-morbidities, allergies, tests and medications:   History - past medical        Past Medical History:   Diagnosis Date   • GERD (gastroesophageal reflux disease) 5/5/2014   • ELIA on CPAP 5/5/2014         History - past surgical   No past surgical history on file.         SUBJECTIVE   John Paul still gets the hip pain, but  he is able to control it with the exercises.  Current Pain: 1-2/10.    Functional Change: Sleeping in bed. Work is going better    OBJECTIVE   ER of the shoulders limited and painful into end range  Ankle motion limited into Df/eversion    Treatment   Therapeutic Exercise:   Step stretches   Added arms as drivers in all 3 planes    Practiced with forward knee bent and forward leg straight    Added same side head movements and opposite rotations to produce more core stability with the exercises.   Knee bends with butt over heels to increase anterior tibialis strength and increase gastrocnemius motion.    Current Home Program (not performed this date except as noted above):   Step stretches  and roller    ASSESSMENT   Everyday John Paul comes in he leaves feeling better. Today we challenged his balance, especially on the left side. Pain is now controlled.     Pain after treatment: not rated/10  Result of above outlined education: Verbalizes understanding and Demonstrates understandingw     Goals:  To be obtained by end of this plan of care:  1. Patient independent with modified and progressed home exercise program.  Patient will demonstrate ability to negotiate level and unlevel surfaces at variable velocities, including change of direction without increased pain or instability to return to age appropriate and community activities at prior level of function.   Patient will be able to ascend and descend 1 flight of stairs using reciprocal pattern without  pain/difficulty.  Patient will be able to sleep 6 hours without disruption from pain.   Lower Extremity Functional Scale: Patient will complete form to reflect an improved score from initial score of 61 to greater than or equal to 70 (0=extreme difficulty; 80=no difficulty) to indicate pt reported improvement in function/disability/impairment (minimal detectable change: 9 points). .    PLAN   Unable to handle weights today with the stretches. Will try next visit. Will add somatic trunk rotation exercises for HEP. Ptient can do this while lying on the andriy at home. Reassess the left hip issue and what may be causing it to linger.    THERAPY DAILY BILLING   Primary Insurance:  -[facility]  Secondary Insurance: N/A    Evaluation Procedures:  No evaluation codes were used on this date of service    Timed Procedures:  Therapeutic Exercise, 30 minutes    Untimed Procedures:  No untimed codes were used on this date of service    Total Treatment Time: 30 minutes        Yes...

## 2022-06-07 NOTE — PROVIDER CONTACT NOTE (OTHER) - ASSESSMENT
Pt A&Ox4, temperature unable to obtain orally, rectal temp-95.7, Vs otherwise stable on 3L NC and cont. pox, pt shivering noted, skin cool to touch, and pt states "she has been feeling cold".

## 2022-06-07 NOTE — DISCHARGE NOTE PROVIDER - CARE PROVIDER_API CALL
Ezio Deluna)  Orthopaedic Surgery  [address], [address]  [place] [address]  Phone: [phone]  Fax: [phone]  Follow Up Time:

## 2022-06-07 NOTE — DISCHARGE NOTE PROVIDER - NSDCFUADDAPPT_GEN_ALL_CORE_FT
Please make appointment to see Endocrinologist in 2 weeks for diabetes management- please call [phone]. Please make appointment to see Endocrinologist in 2 weeks for diabetes management- please call [phone].    Follow up with private pulmonologist within 1 week    Follow up with Dr Deluna within 2 weeks

## 2022-06-07 NOTE — PROVIDER CONTACT NOTE (OTHER) - ACTION/TREATMENT ORDERED:
To obtain hypothermia blanket and reassess 1 hour after blanket is applied, heat packs and blankets applied to patient.

## 2022-06-07 NOTE — OCCUPATIONAL THERAPY INITIAL EVALUATION ADULT - ANTICIPATED DISCHARGE DISPOSITION, OT EVAL
Recommending home with home OT services, however pt prefers outpatient PT. durable medical equipment: tub transfer bench. Discussed with  Tere./home w/ OT

## 2022-06-07 NOTE — PHYSICAL THERAPY INITIAL EVALUATION ADULT - DISCHARGE DISPOSITION, PT EVAL
Alert-The patient is alert, awake and responds to voice. The patient is oriented to time, place, and person. The triage nurse is able to obtain subjective information. home with home PT, however, pt preferred outpatient PT services/home/home w/ assist/home w/ home PT

## 2022-06-07 NOTE — DISCHARGE NOTE PROVIDER - NSDCFUADDINST_GEN_ALL_CORE_FT
Please follow up with your doctor 14 days after your discharge from the hospital (call for appointment).  PT-weight bearing as tolerated.  Aspirin 325 twice daily x 6 weeks total for dvt prevention.  Keep dressing clean and intact, have doctor remove staples/sutures post op day 14 (if applicable) and apply steristrips.  Please follow up with your PMD within 1 month for routine checkup.  Please follow up with your doctor 14 days after your discharge from the hospital (call for appointment).  PT-weight bearing as tolerated.  Aspirin 325 twice daily x 6 weeks total for dvt prevention.  Keep dressing clean and intact, have doctor remove staples/sutures post op day 14 (if applicable) and apply steristrips.  Please follow up with your PMD within 1 month for routine checkup.     Outpatient physical therapy as per surgeon.

## 2022-06-07 NOTE — PROGRESS NOTE ADULT - SUBJECTIVE AND OBJECTIVE BOX
ORTHO PROGRESS NOTE     Pt seen and examined at bedside, denies SOB, CP, Dizziness. N/V/D/HA.  Patient has been hypothermic overnight, now with a heating blanket, q1hr temp checks ordered. Patient on continuous pulse ox and 3L NC. Pain well controlled.     Vital Signs Last 24 Hrs  T(C): 35.4 (07 Jun 2022 04:00), Max: 36.9 (06 Jun 2022 13:54)  T(F): 95.8 (07 Jun 2022 04:00), Max: 98.4 (06 Jun 2022 13:54)  HR: 98 (07 Jun 2022 01:45) (66 - 98)  BP: 126/73 (07 Jun 2022 01:45) (96/53 - 126/73)  BP(mean): 86 (06 Jun 2022 22:30) (67 - 86)  RR: 18 (07 Jun 2022 01:45) (15 - 18)  SpO2: 93% (07 Jun 2022 01:45) (92% - 99%)    Gen: No apparent distress. On 3L NC.   Left LE:   LE:  Dressing C/D/I  BLE: motor intact EHL/FHL/TA/GS .    Sensation is grossly intact to light touch in the distal extremities.    Compartments are soft bilaterally.  Extremities are warm.   DP 2+ BLE     Labs: pending morning collection      A/P  Pt is a 55 yo female s/p left Knee Arthroplasty, POD #1    - Pain control/ Analgesia  - continue warming blankets to address hypothermia - q1h checks. will continue to monitor  - DVT ppx  BID  - PT/OT - wbat/oob    - fu AM labs  - Incentive Spirometer encouraged. will continue to monitor O2 sat   - notify Ortho for questions   -Dispo: Pending PT/OT recs.    Jacinta Robbins PA-C  Team Pager #[phone]

## 2022-06-07 NOTE — DISCHARGE NOTE NURSING/CASE MANAGEMENT/SOCIAL WORK - NSDCPEFALRISK_GEN_ALL_CORE
For information on Fall & Injury Prevention, visit: [email or web address] OR  [email or web address] OR  https://www.cdc.gov/steadi/patient.html

## 2022-06-08 LAB
GLUCOSE BLDC GLUCOMTR-MCNC: 110 MG/DL — HIGH (ref 70–99)
GLUCOSE BLDC GLUCOMTR-MCNC: 132 MG/DL — HIGH (ref 70–99)
GLUCOSE BLDC GLUCOMTR-MCNC: 138 MG/DL — HIGH (ref 70–99)
GLUCOSE BLDC GLUCOMTR-MCNC: 166 MG/DL — HIGH (ref 70–99)

## 2022-06-08 PROCEDURE — 71045 X-RAY EXAM CHEST 1 VIEW: CPT | Mod: 26

## 2022-06-08 RX ORDER — FUROSEMIDE 40 MG
40 TABLET ORAL ONCE
Refills: 0 | Status: COMPLETED | OUTPATIENT
Start: 2022-06-08 | End: 2022-06-08

## 2022-06-08 RX ORDER — TRAMADOL HYDROCHLORIDE 50 MG/1
50 TABLET ORAL THREE TIMES A DAY
Refills: 0 | Status: DISCONTINUED | OUTPATIENT
Start: 2022-06-08 | End: 2022-06-10

## 2022-06-08 RX ORDER — ACETAMINOPHEN 500 MG
975 TABLET ORAL EVERY 6 HOURS
Refills: 0 | Status: DISCONTINUED | OUTPATIENT
Start: 2022-06-08 | End: 2022-06-10

## 2022-06-08 RX ORDER — ACETAMINOPHEN 500 MG
1000 TABLET ORAL ONCE
Refills: 0 | Status: COMPLETED | OUTPATIENT
Start: 2022-06-08 | End: 2022-06-08

## 2022-06-08 RX ORDER — TRAMADOL HYDROCHLORIDE 50 MG/1
50 TABLET ORAL ONCE
Refills: 0 | Status: DISCONTINUED | OUTPATIENT
Start: 2022-06-08 | End: 2022-06-08

## 2022-06-08 RX ORDER — BUDESONIDE, MICRONIZED 100 %
0.5 POWDER (GRAM) MISCELLANEOUS EVERY 12 HOURS
Refills: 0 | Status: DISCONTINUED | OUTPATIENT
Start: 2022-06-08 | End: 2022-06-10

## 2022-06-08 RX ADMIN — Medication 975 MILLIGRAM(S): at 16:39

## 2022-06-08 RX ADMIN — Medication 40 MILLIGRAM(S): at 16:39

## 2022-06-08 RX ADMIN — TRAMADOL HYDROCHLORIDE 50 MILLIGRAM(S): 50 TABLET ORAL at 11:34

## 2022-06-08 RX ADMIN — TRAMADOL HYDROCHLORIDE 50 MILLIGRAM(S): 50 TABLET ORAL at 14:36

## 2022-06-08 RX ADMIN — Medication 325 MILLIGRAM(S): at 17:22

## 2022-06-08 RX ADMIN — CELECOXIB 200 MILLIGRAM(S): 200 CAPSULE ORAL at 06:48

## 2022-06-08 RX ADMIN — Medication 3 MILLILITER(S): at 17:22

## 2022-06-08 RX ADMIN — BUDESONIDE AND FORMOTEROL FUMARATE DIHYDRATE 2 PUFF(S): 160; 4.5 AEROSOL RESPIRATORY (INHALATION) at 05:48

## 2022-06-08 RX ADMIN — CELECOXIB 200 MILLIGRAM(S): 200 CAPSULE ORAL at 17:22

## 2022-06-08 RX ADMIN — CELECOXIB 200 MILLIGRAM(S): 200 CAPSULE ORAL at 05:48

## 2022-06-08 RX ADMIN — MONTELUKAST 10 MILLIGRAM(S): 4 TABLET, CHEWABLE ORAL at 11:02

## 2022-06-08 RX ADMIN — Medication 1000 MILLIGRAM(S): at 11:43

## 2022-06-08 RX ADMIN — Medication 0.5 MILLIGRAM(S): at 18:16

## 2022-06-08 RX ADMIN — Medication 3 MILLILITER(S): at 11:02

## 2022-06-08 RX ADMIN — ATORVASTATIN CALCIUM 20 MILLIGRAM(S): 80 TABLET, FILM COATED ORAL at 22:03

## 2022-06-08 RX ADMIN — Medication 975 MILLIGRAM(S): at 17:04

## 2022-06-08 RX ADMIN — TRAMADOL HYDROCHLORIDE 50 MILLIGRAM(S): 50 TABLET ORAL at 19:46

## 2022-06-08 RX ADMIN — Medication 400 MILLIGRAM(S): at 10:43

## 2022-06-08 RX ADMIN — TRAMADOL HYDROCHLORIDE 50 MILLIGRAM(S): 50 TABLET ORAL at 10:34

## 2022-06-08 RX ADMIN — PANTOPRAZOLE SODIUM 40 MILLIGRAM(S): 20 TABLET, DELAYED RELEASE ORAL at 05:47

## 2022-06-08 RX ADMIN — SENNA PLUS 2 TABLET(S): 8.6 TABLET ORAL at 22:03

## 2022-06-08 RX ADMIN — Medication 975 MILLIGRAM(S): at 22:04

## 2022-06-08 RX ADMIN — Medication 50 MILLIGRAM(S): at 16:39

## 2022-06-08 RX ADMIN — TRAMADOL HYDROCHLORIDE 50 MILLIGRAM(S): 50 TABLET ORAL at 15:36

## 2022-06-08 RX ADMIN — Medication 975 MILLIGRAM(S): at 22:23

## 2022-06-08 RX ADMIN — POLYETHYLENE GLYCOL 3350 17 GRAM(S): 17 POWDER, FOR SOLUTION ORAL at 05:48

## 2022-06-08 RX ADMIN — Medication 3 MILLILITER(S): at 23:03

## 2022-06-08 RX ADMIN — Medication 3 MILLILITER(S): at 05:47

## 2022-06-08 RX ADMIN — TRAMADOL HYDROCHLORIDE 50 MILLIGRAM(S): 50 TABLET ORAL at 20:16

## 2022-06-08 RX ADMIN — Medication 325 MILLIGRAM(S): at 05:47

## 2022-06-08 NOTE — CONSULT NOTE ADULT - ASSESSMENT
ASSESSMENT:    56 year old morbidly obese gentlewoman, lifelong non-smoker, with history of asthma (maintained on Advair and singulair) and obstructive sleep apnea (non-compliant with nocturnal CPAP. She also has a history of HTN, DM and severe osteoarthritis of the left knee making ambulation difficulty. She was admitted electively on June 4th and underwent an uncomplicated left knee replacement under spinal anesthesia. She is now standing by the side of her bed holding on to a walker having just received pain medication. She has an occasional cough productive of scant sputum. She has mild chest congestion and wheeze. She has no fevers, chills or sweats. No chest pain/pressure or palpitations.       CXR -> low lung volumes may be causing prominent bilateral perihilar vascular markings - there is a left basilar/retrocardiac triangular opacity - small right pleural effusion with associated right basilar atelectasis    hypoxemia -> multifactorial  1) left lower lobe atelectasis  2) restrictive lung disease due to morbid obesity limiting chest wall excursion and diaphragmatic excursion  3) asthma exacerbation    PLAN/RECOMMENDATIONS:    oxygen supplementation to keep saturation greater than 92%  nocturnal BIPAP  incentive spirometry  chest CT to better evaluate the lung parenchyma and pleural space  lasix 40mg x 1  prednisone 50mg daily  albuterol/atrovent nebs q6h  pulmicort 0.5mg nebs q12h - give after duoneb - rinse mouth after use  singulair 10mg at bedtime  cardiac meds: norvasc/indapamide/cozaar/lipitor  GI/DVT prophylaxis: protonix/ASA 325mg 2 times daily/SCDs  analgesics: tylenol ATC/celecoxib  glucose control on steroids  bowel regimen    Thank you for the courtesy of this referral. Plan of care discussed with the patient and her daughter at bedside and with the orthopedic team.    Royer Watkins MD, [facility]  [phone]  Pulmonary Medicine         ASSESSMENT:    56 year old morbidly obese gentlewoman, lifelong non-smoker, with history of asthma (maintained on Advair and singulair) and obstructive sleep apnea (non-compliant with nocturnal CPAP. She also has a history of HTN, DM and severe osteoarthritis of the left knee making ambulation difficulty. She was admitted electively on June 4th and underwent an uncomplicated left knee replacement under spinal anesthesia. She is now standing by the side of her bed holding on to a walker having just received pain medication. She has an occasional cough productive of scant sputum. She has mild chest congestion and wheeze. She has no fevers, chills or sweats. No chest pain/pressure or palpitations.     CXR -> low lung volumes may be causing prominent bilateral perihilar vascular markings - there is a left basilar/retrocardiac triangular opacity - small right pleural effusion with associated right basilar atelectasis    hypoxemia -> multifactorial  1) left lower lobe atelectasis  2) restrictive lung disease due to morbid obesity limiting chest wall excursion and diaphragmatic excursion  3) asthma exacerbation  4) mild pulmonary edema with (?) bilateral pleural effusions on continuous IV fluids    PLAN/RECOMMENDATIONS:    oxygen supplementation to keep saturation greater than 92%  nocturnal BIPAP (ordered)  incentive spirometry - the patient uses the device well  chest CT to better evaluate the lung parenchyma and pleural space  lasix 40mg x 1  prednisone 50mg daily  albuterol/atrovent nebs q6h  pulmicort 0.5mg nebs q12h - give after duoneb - rinse mouth after use  singulair 10mg at bedtime  cardiac meds: norvasc/indapamide/cozaar/lipitor  GI/DVT prophylaxis: protonix/ASA 325mg 2 times daily/SCDs  analgesics: tylenol ATC/celecoxib  glucose control on steroids  bowel regimen    Thank you for the courtesy of this referral. Plan of care discussed with the patient and her daughter at bedside and with the orthopedic team.    Royer Watkins MD, [facility]  [phone]  Pulmonary Medicine

## 2022-06-08 NOTE — PROGRESS NOTE ADULT - SUBJECTIVE AND OBJECTIVE BOX
ORTHO PROGRESS NOTE     Pt seen and examined at bedside, denies SOB, CP, Dizziness. N/V/D/HA.  No significant overnight events. Pain well controlled. Patient ambulated with PT.      Vital Signs Last 24 Hrs  T(C): 36.7 (08 Jun 2022 05:07), Max: 36.9 (07 Jun 2022 08:09)  T(F): 98 (08 Jun 2022 05:07), Max: 98.4 (07 Jun 2022 08:09)  HR: 81 (08 Jun 2022 05:07) (66 - 86)  BP: 103/67 (08 Jun 2022 05:07) (90/56 - 131/76)  BP(mean): --  RR: 16 (08 Jun 2022 05:07) (16 - 18)  SpO2: 94% (08 Jun 2022 05:07) (92% - 98%)    Gen: No apparent distress  Left LE:   Dressing C/D/I.    BLE: motor intact EHL/FHL/TA/GS .    Sensation is grossly intact to light touch in the distal extremities.    Compartments are soft bilaterally.  Extremities are warm .  DP 2+ BLE     Labs:  CBC Full  -  ( 07 Jun 2022 06:50 )  WBC Count : 8.41 K/uL  RBC Count : 4.40 M/uL  Hemoglobin : 11.0 g/dL  Hematocrit : 35.4 %  Platelet Count - Automated : 253 K/uL  Mean Cell Volume : 80.5 fl  Mean Cell Hemoglobin : 25.0 pg  Mean Cell Hemoglobin Concentration : 31.1 gm/dL  Auto Neutrophil # : x  Auto Lymphocyte # : x  Auto Monocyte # : x  Auto Eosinophil # : x  Auto Basophil # : x  Auto Neutrophil % : x  Auto Lymphocyte % : x  Auto Monocyte % : x  Auto Eosinophil % : x  Auto Basophil % : x        06-07    135  |  97  |  13  ----------------------------<  144<H>  4.1   |  28  |  0.67    Ca    9.1      07 Jun 2022 06:50       A/P  Pt is a 57 yo female s/p left total Knee Arthroplasty, POD #2    - Pain control/ Analgesia  - DVT ppx:  BID, venodynes  - PT/OT - wbat/oob   - Incentive Spirometer encouraged.   - Patient on 3L NC overnight due to BIRD. Monitor O2sat today  - notify Ortho for questions   -Dispo: Home pending weaning from supplemental oxygen.     Jacinta Robbins PA-C  Team Pager #[phone]

## 2022-06-08 NOTE — CONSULT NOTE ADULT - SUBJECTIVE AND OBJECTIVE BOX
[facility] - [facility] - CONSULT NOTE    HPI: 56 year old morbidly obese gentlewoman, lifelong non-smoker, with history of asthma (maintained on Advair and singulair) and obstructive sleep apnea on nocturnal CPAP. She also has a history of HTN, DM and severe osteoarthritis of the left knee making ambulation difficulty. She was admitted electively on  and underwent an uncomplicated left knee replacement under spinal anesthesia.    PMHX:  Morbid obesity  Obstructive sleep apnea  Asthma  Osteoarthritis, left knee  Hypertension  Diabetes mellitus    PSHX:    Right wrist surgery    FAMILY HISTORY:      SOCIAL HISTORY:  single, lifelong non-smoker; retired from [employer]    Pulmonary Medications:   albuterol/ipratropium for Nebulization 3 milliLiter(s) Nebulizer every 6 hours  budesonide  80 MICROgram(s)/formoterol 4.5 MICROgram(s) Inhaler 2 Puff(s) Inhalation two times a day  montelukast 10 milliGRAM(s) Oral daily      Antimicrobials:      Cardiology:  amLODIPine   Tablet 10 milliGRAM(s) Oral daily  indapamide 2.5 milliGRAM(s) Oral daily  losartan 100 milliGRAM(s) Oral daily      Other:  acetaminophen     Tablet .. 975 milliGRAM(s) Oral every 6 hours  acetaminophen   IVPB .. 1000 milliGRAM(s) IV Intermittent once  aspirin enteric coated 325 milliGRAM(s) Oral two times a day  atorvastatin 20 milliGRAM(s) Oral at bedtime  celecoxib 200 milliGRAM(s) Oral every 12 hours  dextrose 5%. 1000 milliLiter(s) IV Continuous <Continuous>  dextrose 5%. 1000 milliLiter(s) IV Continuous <Continuous>  dextrose 50% Injectable 25 Gram(s) IV Push once  dextrose 50% Injectable 12.5 Gram(s) IV Push once  dextrose 50% Injectable 25 Gram(s) IV Push once  glucagon  Injectable 1 milliGRAM(s) IntraMuscular once  insulin lispro (ADMELOG) corrective regimen sliding scale   SubCutaneous three times a day before meals  insulin lispro (ADMELOG) corrective regimen sliding scale   SubCutaneous at bedtime  pantoprazole    Tablet 40 milliGRAM(s) Oral before breakfast  polyethylene glycol 3350 17 Gram(s) Oral at bedtime  senna 2 Tablet(s) Oral at bedtime  sodium chloride 0.9%. 1000 milliLiter(s) IV Continuous <Continuous>      Prn:  MEDICATIONS  (PRN):  ALBUTerol    90 MICROgram(s) HFA Inhaler 2 Puff(s) Inhalation every 6 hours PRN Shortness of Breath and/or Wheezing  bisacodyl Suppository 10 milliGRAM(s) Rectal once PRN Constipation  dextrose Oral Gel 15 Gram(s) Oral once PRN Blood Glucose LESS THAN 70 milliGRAM(s)/deciliter  magnesium hydroxide Suspension 30 milliLiter(s) Oral daily PRN Constipation  ondansetron Injectable 4 milliGRAM(s) IV Push every 6 hours PRN Nausea and/or Vomiting  traMADol 50 milliGRAM(s) Oral three times a day PRN Moderate Pain (4 - 6)      Allergies    amoxicillin (Pruritus)  Bananas (Anaphylaxis)  Benadryl (Hives)  erythromycin (Hives)  latex (Rash)  Zithromax (Hives)    Intolerances        HOME MEDICATIONS: see  H & P    REVIEW OF SYSTEMS:  Constitutional: As per HPI  HEENT: Within normal limits  CV: As per HPI  Resp: As per HPI  GI: Within normal limits   : Within normal limits  Musculoskeletal: Within normal limits  Skin: Within normal limits  Neurological: Within normal limits  Psychiatric: Within normal limits  Endocrine: Within normal limits  Hematologic/Lymphatic: Within normal limits  Allergic/Immunologic: Within normal limits    [x] All other systems negative    OBJECTIVE:    I&O's Detail    2022 07:  -  2022 07:00  --------------------------------------------------------  IN:    Oral Fluid: 600 mL    sodium chloride 0.9%: 825 mL  Total IN: 1425 mL    OUT:    Voided (mL): 550 mL  Total OUT: 550 mL    Total NET: 875 mL      2022 07:01  -  2022 14:29  --------------------------------------------------------  IN:    Oral Fluid: 360 mL  Total IN: 360 mL    OUT:  Total OUT: 0 mL    Total NET: 360 mL    POCT Blood Glucose.: 138 mg/dL (2022 11:55)  POCT Blood Glucose.: 110 mg/dL (2022 07:58)  POCT Blood Glucose.: 166 mg/dL (2022 21:06)  POCT Blood Glucose.: 166 mg/dL (2022 17:39)      PHYSICAL EXAM:  ICU Vital Signs Last 24 Hrs  T(C): 36.6 (2022 13:41), Max: 36.7 (2022 05:07)  T(F): 97.8 (2022 13:41), Max: 98 (2022 05:07)  HR: 76 (2022 13:41) (67 - 86)  BP: 104/67 (2022 13:41) (96/61 - 126/79)  BP(mean): --  ABP: --  ABP(mean): --  RR: 16 (2022 13:41) (16 - 18)  SpO2: 93% (2022 13:41) (93% - 98%)    General: Awake. Alert. Cooperative. No distress. Appears stated age 	  HEENT:   Atraumatic. Normocephalic. Anicteric. Normal oral mucosa. PERRL. EOMI.  Neck: Supple. Trachea midline. Thyroid without enlargement/tenderness/nodules. No carotid bruit. No JVD.	  Cardiovascular: Regular rate and rhythm. S1 S2 normal. No murmurs, rubs or gallops.  Respiratory: Respirations unlabored. Clear to auscultation and percussion bilaterally. No curvature.  Abdomen: Soft. Non-tender. Non-distended. No organomegaly. No masses. Normal bowel sounds.  Extremities: Warm to touch. No clubbing or cyanosis. No pedal edema.  Pulses: 2+ peripheral pulses all extremities.	  Skin: Normal skin color. No rashes or lesions. No ecchymoses. No cyanosis. Warm to touch.  Lymph Nodes: Cervical, supraclavicular and axillary nodes normal  Neurological: Motor and sensory examination equal and normal. A and O x 3  Psychiatry: Appropriate mood and affect.      LABS:                          11.0   8.41  )-----------( 253      ( 2022 06:50 )             35.4     CBC    WBC  8.41 <==    Hemoglobin  11.0 <<==    Hematocrit  35.4 <==    Platelets  253 <==      135  |  97  |  13  ----------------------------<  144<H>    06-07  4.1   |  28  |  0.67    LYTES    sodium  135 <==    potassium   4.1 <==    chloride  97 <==    carbon dioxide  28 <==    =============================================================================================  RENAL FUNCTION:    Creatinine:   0.67  <<==    BUN:   13 <==    ============================================================================================    calcium   9.1 <==    ===========================================================================================  MICROBIOLOGY:     COVID-19 PCR . (22 @ 12:47)   COVID-19 PCR: NotDetec:       RADIOLOGY:  [x ] Chest radiographs reviewed and interpreted by me    EXAM:  XR KNEE 1-2 VIEWS LT                          PROCEDURE DATE:  2022      INTERPRETATION:  CLINICAL INDICATION: baseline postoperative evaluation   status post robot-assisted left total knee replacement for osteoarthritis    EXAM:  Frontal and crosstable lateral left knee from 2022 at 1917.    IMPRESSION:  Unconstrained left total knee prosthesis implanted.    Intact and aligned hardware and no periprosthetic fractures.    Postoperative soft tissue changes.    Correlate with intraoperative findings.    JOVANNY DOBBINS MD; Attending Radiologist  This document has been electronically signed. 2022  8:49AM  ---------------------------------------------------------------------------------------------------------------  CT KNEE ONLY LT  - ORDERED BY: EVITA DIAZ    PROCEDURE DATE:  2022       INTERPRETATION:  EXAMINATION: CT left knee without contrast    CLINICAL INFORMATION: Left knee pain. Left knee osteoarthritis.   Preoperative knee replacement.    TECHNIQUE: Imaging was performed as per the [facility] protocol. Axial CT   images were obtained through the left knee. Coronal and sagittal   reformatted images were made. Axial images were also obtained through the   left hip and ankle    FINDINGS: There is left knee osteoarthritis, most pronounced in the   lateral compartment where there is severe joint space narrowing with   subchondral cystic change and sclerosis. There are tricompartmental   osteophytes. There is a moderate-sized left knee joint effusion.    There is mild left hip osteoarthritis. There is posterior subtalar   osteoarthritis. There are plantar and dorsal calcaneal enthesophytes.    IMPRESSION:    Left knee osteoarthritis, as above.    RENATE SUN MD; Attending Radiologist   This document has been electronically signed. 2022  4:23PM  ---------------------------------------------------------------------------------------------------------------           [facility] - [facility] - CONSULT NOTE    HPI: 56 year old morbidly obese gentlewoman, lifelong non-smoker, with history of asthma (maintained on Advair and singulair) and obstructive sleep apnea (non-compliant with nocturnal CPAP. She also has a history of HTN, DM and severe osteoarthritis of the left knee making ambulation difficulty. She was admitted electively on  and underwent an uncomplicated left knee replacement under spinal anesthesia. She is now standing by the side of her bed holding on to a walker having just received pain medication. She has an occasional cough productive of scant sputum. She has mild chest congestion and wheeze. She has no fevers, chills or sweats. No chest pain/pressure or palpitations. CXR is abnormal. Asked to evaluate.    PMHX:  Morbid obesity  Obstructive sleep apnea  Asthma  Osteoarthritis, left knee  Hypertension  Diabetes mellitus    PSHX:    Right wrist surgery    FAMILY HISTORY:  no pertinent past medical history in first degree relatives    SOCIAL HISTORY:  single - lives with her daughter;  lifelong non-smoker; retired from [employer]    Pulmonary Medications:   albuterol/ipratropium for Nebulization 3 milliLiter(s) Nebulizer every 6 hours  budesonide  80 MICROgram(s)/formoterol 4.5 MICROgram(s) Inhaler 2 Puff(s) Inhalation two times a day  montelukast 10 milliGRAM(s) Oral daily      Antimicrobials:      Cardiology:  amLODIPine   Tablet 10 milliGRAM(s) Oral daily  indapamide 2.5 milliGRAM(s) Oral daily  losartan 100 milliGRAM(s) Oral daily      Other:  acetaminophen     Tablet .. 975 milliGRAM(s) Oral every 6 hours  acetaminophen   IVPB .. 1000 milliGRAM(s) IV Intermittent once  aspirin enteric coated 325 milliGRAM(s) Oral two times a day  atorvastatin 20 milliGRAM(s) Oral at bedtime  celecoxib 200 milliGRAM(s) Oral every 12 hours  dextrose 5%. 1000 milliLiter(s) IV Continuous <Continuous>  dextrose 5%. 1000 milliLiter(s) IV Continuous <Continuous>  dextrose 50% Injectable 25 Gram(s) IV Push once  dextrose 50% Injectable 12.5 Gram(s) IV Push once  dextrose 50% Injectable 25 Gram(s) IV Push once  glucagon  Injectable 1 milliGRAM(s) IntraMuscular once  insulin lispro (ADMELOG) corrective regimen sliding scale   SubCutaneous three times a day before meals  insulin lispro (ADMELOG) corrective regimen sliding scale   SubCutaneous at bedtime  pantoprazole    Tablet 40 milliGRAM(s) Oral before breakfast  polyethylene glycol 3350 17 Gram(s) Oral at bedtime  senna 2 Tablet(s) Oral at bedtime  sodium chloride 0.9%. 1000 milliLiter(s) IV Continuous <Continuous>      Prn:  MEDICATIONS  (PRN):  ALBUTerol    90 MICROgram(s) HFA Inhaler 2 Puff(s) Inhalation every 6 hours PRN Shortness of Breath and/or Wheezing  bisacodyl Suppository 10 milliGRAM(s) Rectal once PRN Constipation  dextrose Oral Gel 15 Gram(s) Oral once PRN Blood Glucose LESS THAN 70 milliGRAM(s)/deciliter  magnesium hydroxide Suspension 30 milliLiter(s) Oral daily PRN Constipation  ondansetron Injectable 4 milliGRAM(s) IV Push every 6 hours PRN Nausea and/or Vomiting  traMADol 50 milliGRAM(s) Oral three times a day PRN Moderate Pain (4 - 6)      Allergies    amoxicillin (Pruritus)  Bananas (Anaphylaxis)  Benadryl (Hives)  erythromycin (Hives)  latex (Rash)  Zithromax (Hives)    HOME MEDICATIONS: see  H & P    REVIEW OF SYSTEMS:  Constitutional: As per HPI  HEENT: Within normal limits  CV: As per HPI  Resp: As per HPI  GI: Within normal limits   : Within normal limits  Musculoskeletal: s/p left knee replacement  Skin: Within normal limits  Neurological: Within normal limits  Psychiatric: Within normal limits  Endocrine: Within normal limits  Hematologic/Lymphatic: Within normal limits  Allergic/Immunologic: Within normal limits    [x] All other systems negative    OBJECTIVE:    I&O's Detail    2022 07:  -  2022 07:00  --------------------------------------------------------  IN:    Oral Fluid: 600 mL    sodium chloride 0.9%: 825 mL  Total IN: 1425 mL    OUT:    Voided (mL): 550 mL  Total OUT: 550 mL    Total NET: 875 mL      2022 07:01  -  2022 14:29  --------------------------------------------------------  IN:    Oral Fluid: 360 mL  Total IN: 360 mL    OUT:  Total OUT: 0 mL    Total NET: 360 mL    POCT Blood Glucose.: 138 mg/dL (2022 11:55)  POCT Blood Glucose.: 110 mg/dL (2022 07:58)  POCT Blood Glucose.: 166 mg/dL (2022 21:06)  POCT Blood Glucose.: 166 mg/dL (2022 17:39)      PHYSICAL EXAM:  ICU Vital Signs Last 24 Hrs  T(C): 36.6 (2022 13:41), Max: 36.7 (2022 05:07)  T(F): 97.8 (2022 13:41), Max: 98 (2022 05:07)  HR: 76 (2022 13:41) (67 - 86)  BP: 104/67 (2022 13:41) (96/61 - 126/79)  BP(mean): --  ABP: --  ABP(mean): --  RR: 16 (2022 13:41) (16 - 18)  SpO2: 93% (2022 13:41) (93% - 98%) on 2lpm nasal canula -> 85% on room air    General: Awake. Alert. Cooperative. No distress. Appears stated age. Standing by the side of her bed. Morbid obesity.	  HEENT: Atraumatic. Normocephalic. Anicteric. Normal oral mucosa. PERRL. EOMI. Mallampati class IV airway.  Neck: Supple. Trachea midline. Thyroid without enlargement/tenderness/nodules. No carotid bruit. No JVD. Short and wide.	  Cardiovascular: Regular rate and rhythm. S1 S2 normal. No murmurs, rubs or gallops.  Respiratory: Respirations unlabored. Bilateral wheeze. No curvature.  Abdomen: Soft. Non-tender. Non-distended. No organomegaly. No masses. Normal bowel sounds.  Extremities: Warm to touch. No clubbing or cyanosis. No pedal edema. Bilateral SCDs. Left knee bandaged  Pulses: 2+ peripheral pulses all extremities.	  Skin: Normal skin color. No rashes or lesions. No ecchymoses. No cyanosis. Warm to touch.  Lymph Nodes: Cervical, supraclavicular and axillary nodes normal  Neurological: Motor and sensory examination equal and normal. A and O x 3  Psychiatry: Appropriate mood and affect.      LABS:                          11.0   8.41  )-----------( 253      ( 2022 06:50 )             35.4     CBC    WBC  8.41 <==    Hemoglobin  11.0 <<==    Hematocrit  35.4 <==    Platelets  253 <==      135  |  97  |  13  ----------------------------<  144<H>    06-07  4.1   |  28  |  0.67    LYTES    sodium  135 <==    potassium   4.1 <==    chloride  97 <==    carbon dioxide  28 <==    =============================================================================================  RENAL FUNCTION:    Creatinine:   0.67  <<==    BUN:   13 <==    ============================================================================================    calcium   9.1 <==    ===========================================================================================  MICROBIOLOGY:     COVID-19 PCR . (22 @ 12:47)   COVID-19 PCR: NotDetec:       RADIOLOGY:  [x ] Chest radiographs reviewed and interpreted by me    EXAM:  XR CHEST PORTABLE URGENT 1V                          PROCEDURE DATE:  2022      FINDINGS:  Low lung volumes at time film acquisition limits diagnostic certainty.    Heart size is not accurately assessed on this single AP projection due to   magnification, but is questionably enlarged.    Prominent bilateral perihilar vascular markings with left   basilar/retrocardiac triangular opacity. Small right pleural effusion   with associated right basilar atelectasis.    There is no pneumothorax.  No acute bony abnormality.    IMPRESSION:  Borderline cardiac enlargement with prominent bilateral perihilar   vascular markings and left basilar/retrocardiac triangular opacity. Small   right pleural effusion with associated right basilar atelectasis.    Findings are most consistent with pulmonary venous hypertension/pulmonary   edema in this patient with positive fluid balance per EMR review. Left   lower lobe atelectasis/infectious consolidation cannot be excluded on the   basis x-ray alone.    JASMINE BUSTILLOS MD; Resident Radiologist  This document has been electronically signed.  QASIM BONILLA MD; Attending Radiologist  This document has been electronically signed. 2022  2:59PM  ---------------------------------------------------------------------------------------------------------------  EXAM:  XR KNEE 1-2 VIEWS LT                          PROCEDURE DATE:  2022      INTERPRETATION:  CLINICAL INDICATION: baseline postoperative evaluation   status post robot-assisted left total knee replacement for osteoarthritis    EXAM:  Frontal and crosstable lateral left knee from 2022 at 1917.    IMPRESSION:  Unconstrained left total knee prosthesis implanted.    Intact and aligned hardware and no periprosthetic fractures.    Postoperative soft tissue changes.    Correlate with intraoperative findings.    JOVANNY DOBBINS MD; Attending Radiologist  This document has been electronically signed. 2022  8:49AM  ---------------------------------------------------------------------------------------------------------------  CT KNEE ONLY LT  - ORDERED BY: EVITA DIAZ    PROCEDURE DATE:  2022       INTERPRETATION:  EXAMINATION: CT left knee without contrast    CLINICAL INFORMATION: Left knee pain. Left knee osteoarthritis.   Preoperative knee replacement.    TECHNIQUE: Imaging was performed as per the [facility] protocol. Axial CT   images were obtained through the left knee. Coronal and sagittal   reformatted images were made. Axial images were also obtained through the   left hip and ankle    FINDINGS: There is left knee osteoarthritis, most pronounced in the   lateral compartment where there is severe joint space narrowing with   subchondral cystic change and sclerosis. There are tricompartmental   osteophytes. There is a moderate-sized left knee joint effusion.    There is mild left hip osteoarthritis. There is posterior subtalar   osteoarthritis. There are plantar and dorsal calcaneal enthesophytes.    IMPRESSION:    Left knee osteoarthritis, as above.    RENATE SUN MD; Attending Radiologist   This document has been electronically signed. 2022  4:23PM  ---------------------------------------------------------------------------------------------------------------           [facility] - [facility] - CONSULT NOTE    HPI: 56 year old morbidly obese gentlewoman, lifelong non-smoker, with history of asthma (maintained on Advair and singulair) and obstructive sleep apnea (non-compliant with nocturnal CPAP. She also has a history of HTN, DM and severe osteoarthritis of the left knee making ambulation difficulty. She was admitted electively on  and underwent an uncomplicated left knee replacement under spinal anesthesia. She has continued on IV fluids since the time of surgery. She is now standing by the side of her bed holding on to a walker having just received pain medication. She has an occasional cough productive of scant sputum. She has mild chest congestion and wheeze. She has no fevers, chills or sweats. No chest pain/pressure or palpitations. CXR is abnormal. Asked to evaluate.    PMHX:  Morbid obesity  Obstructive sleep apnea  Asthma  Osteoarthritis, left knee  Hypertension  Diabetes mellitus    PSHX:    Right wrist surgery    FAMILY HISTORY:  no pertinent past medical history in first degree relatives    SOCIAL HISTORY:  single - lives with her daughter;  lifelong non-smoker; retired from [employer]    Pulmonary Medications:   albuterol/ipratropium for Nebulization 3 milliLiter(s) Nebulizer every 6 hours  budesonide  80 MICROgram(s)/formoterol 4.5 MICROgram(s) Inhaler 2 Puff(s) Inhalation two times a day  montelukast 10 milliGRAM(s) Oral daily      Antimicrobials:      Cardiology:  amLODIPine   Tablet 10 milliGRAM(s) Oral daily  indapamide 2.5 milliGRAM(s) Oral daily  losartan 100 milliGRAM(s) Oral daily      Other:  acetaminophen     Tablet .. 975 milliGRAM(s) Oral every 6 hours  acetaminophen   IVPB .. 1000 milliGRAM(s) IV Intermittent once  aspirin enteric coated 325 milliGRAM(s) Oral two times a day  atorvastatin 20 milliGRAM(s) Oral at bedtime  celecoxib 200 milliGRAM(s) Oral every 12 hours  dextrose 5%. 1000 milliLiter(s) IV Continuous <Continuous>  dextrose 5%. 1000 milliLiter(s) IV Continuous <Continuous>  dextrose 50% Injectable 25 Gram(s) IV Push once  dextrose 50% Injectable 12.5 Gram(s) IV Push once  dextrose 50% Injectable 25 Gram(s) IV Push once  glucagon  Injectable 1 milliGRAM(s) IntraMuscular once  insulin lispro (ADMELOG) corrective regimen sliding scale   SubCutaneous three times a day before meals  insulin lispro (ADMELOG) corrective regimen sliding scale   SubCutaneous at bedtime  pantoprazole    Tablet 40 milliGRAM(s) Oral before breakfast  polyethylene glycol 3350 17 Gram(s) Oral at bedtime  senna 2 Tablet(s) Oral at bedtime  sodium chloride 0.9%. 1000 milliLiter(s) IV Continuous <Continuous>      Prn:  MEDICATIONS  (PRN):  ALBUTerol    90 MICROgram(s) HFA Inhaler 2 Puff(s) Inhalation every 6 hours PRN Shortness of Breath and/or Wheezing  bisacodyl Suppository 10 milliGRAM(s) Rectal once PRN Constipation  dextrose Oral Gel 15 Gram(s) Oral once PRN Blood Glucose LESS THAN 70 milliGRAM(s)/deciliter  magnesium hydroxide Suspension 30 milliLiter(s) Oral daily PRN Constipation  ondansetron Injectable 4 milliGRAM(s) IV Push every 6 hours PRN Nausea and/or Vomiting  traMADol 50 milliGRAM(s) Oral three times a day PRN Moderate Pain (4 - 6)      Allergies    amoxicillin (Pruritus)  Bananas (Anaphylaxis)  Benadryl (Hives)  erythromycin (Hives)  latex (Rash)  Zithromax (Hives)    HOME MEDICATIONS: see  H & P    REVIEW OF SYSTEMS:  Constitutional: As per HPI  HEENT: Within normal limits  CV: As per HPI  Resp: As per HPI  GI: Within normal limits   : Within normal limits  Musculoskeletal: s/p left knee replacement  Skin: Within normal limits  Neurological: Within normal limits  Psychiatric: Within normal limits  Endocrine: Within normal limits  Hematologic/Lymphatic: Within normal limits  Allergic/Immunologic: Within normal limits    [x] All other systems negative    OBJECTIVE:    I&O's Detail    2022 07:01  -  2022 07:00  --------------------------------------------------------  IN:    Oral Fluid: 600 mL    sodium chloride 0.9%: 825 mL  Total IN: 1425 mL    OUT:    Voided (mL): 550 mL  Total OUT: 550 mL    Total NET: 875 mL      2022 07:01  -  2022 14:29  --------------------------------------------------------  IN:    Oral Fluid: 360 mL  Total IN: 360 mL    OUT:  Total OUT: 0 mL    Total NET: 360 mL    POCT Blood Glucose.: 138 mg/dL (2022 11:55)  POCT Blood Glucose.: 110 mg/dL (2022 07:58)  POCT Blood Glucose.: 166 mg/dL (2022 21:06)  POCT Blood Glucose.: 166 mg/dL (2022 17:39)      PHYSICAL EXAM:  ICU Vital Signs Last 24 Hrs  T(C): 36.6 (2022 13:41), Max: 36.7 (2022 05:07)  T(F): 97.8 (2022 13:41), Max: 98 (2022 05:07)  HR: 76 (2022 13:41) (67 - 86)  BP: 104/67 (2022 13:41) (96/61 - 126/79)  BP(mean): --  ABP: --  ABP(mean): --  RR: 16 (2022 13:41) (16 - 18)  SpO2: 93% (2022 13:41) (93% - 98%) on 2lpm nasal canula -> 85% on room air    General: Awake. Alert. Cooperative. No distress. Appears stated age. Standing by the side of her bed. Morbid obesity.	  HEENT: Atraumatic. Normocephalic. Anicteric. Normal oral mucosa. PERRL. EOMI. Mallampati class IV airway.  Neck: Supple. Trachea midline. Thyroid without enlargement/tenderness/nodules. No carotid bruit. No JVD. Short and wide.	  Cardiovascular: Regular rate and rhythm. S1 S2 normal. No murmurs, rubs or gallops.  Respiratory: Respirations unlabored. Bilateral wheeze. No curvature.  Abdomen: Soft. Non-tender. Non-distended. No organomegaly. No masses. Normal bowel sounds.  Extremities: Warm to touch. No clubbing or cyanosis. No pedal edema. Bilateral SCDs. Left knee bandaged  Pulses: 2+ peripheral pulses all extremities.	  Skin: Normal skin color. No rashes or lesions. No ecchymoses. No cyanosis. Warm to touch.  Lymph Nodes: Cervical, supraclavicular and axillary nodes normal  Neurological: Motor and sensory examination equal and normal. A and O x 3  Psychiatry: Appropriate mood and affect.      LABS:                          11.0   8.41  )-----------( 253      ( 2022 06:50 )             35.4     CBC    WBC  8.41 <==    Hemoglobin  11.0 <<==    Hematocrit  35.4 <==    Platelets  253 <==      135  |  97  |  13  ----------------------------<  144<H>    06-07  4.1   |  28  |  0.67    LYTES    sodium  135 <==    potassium   4.1 <==    chloride  97 <==    carbon dioxide  28 <==    =============================================================================================  RENAL FUNCTION:    Creatinine:   0.67  <<==    BUN:   13 <==    ============================================================================================    calcium   9.1 <==    ===========================================================================================  MICROBIOLOGY:     COVID-19 PCR . (22 @ 12:47)   COVID-19 PCR: NotDetec:       RADIOLOGY:  [x ] Chest radiographs reviewed and interpreted by me    EXAM:  XR CHEST PORTABLE URGENT 1V                          PROCEDURE DATE:  2022      FINDINGS:  Low lung volumes at time film acquisition limits diagnostic certainty.    Heart size is not accurately assessed on this single AP projection due to   magnification, but is questionably enlarged.    Prominent bilateral perihilar vascular markings with left   basilar/retrocardiac triangular opacity. Small right pleural effusion   with associated right basilar atelectasis.    There is no pneumothorax.  No acute bony abnormality.    IMPRESSION:  Borderline cardiac enlargement with prominent bilateral perihilar   vascular markings and left basilar/retrocardiac triangular opacity. Small   right pleural effusion with associated right basilar atelectasis.    Findings are most consistent with pulmonary venous hypertension/pulmonary   edema in this patient with positive fluid balance per EMR review. Left   lower lobe atelectasis/infectious consolidation cannot be excluded on the   basis x-ray alone.    JASMINE BUSTILLOS MD; Resident Radiologist  This document has been electronically signed.  QASIM BONILLA MD; Attending Radiologist  This document has been electronically signed. 2022  2:59PM  ---------------------------------------------------------------------------------------------------------------  EXAM:  XR KNEE 1-2 VIEWS LT                          PROCEDURE DATE:  2022      INTERPRETATION:  CLINICAL INDICATION: baseline postoperative evaluation   status post robot-assisted left total knee replacement for osteoarthritis    EXAM:  Frontal and crosstable lateral left knee from 2022 at 1917.    IMPRESSION:  Unconstrained left total knee prosthesis implanted.    Intact and aligned hardware and no periprosthetic fractures.    Postoperative soft tissue changes.    Correlate with intraoperative findings.    JOVANNY DOBBINS MD; Attending Radiologist  This document has been electronically signed. 2022  8:49AM  ---------------------------------------------------------------------------------------------------------------  CT KNEE ONLY LT  - ORDERED BY: EVITA DIAZ    PROCEDURE DATE:  2022       INTERPRETATION:  EXAMINATION: CT left knee without contrast    CLINICAL INFORMATION: Left knee pain. Left knee osteoarthritis.   Preoperative knee replacement.    TECHNIQUE: Imaging was performed as per the [facility] protocol. Axial CT   images were obtained through the left knee. Coronal and sagittal   reformatted images were made. Axial images were also obtained through the   left hip and ankle    FINDINGS: There is left knee osteoarthritis, most pronounced in the   lateral compartment where there is severe joint space narrowing with   subchondral cystic change and sclerosis. There are tricompartmental   osteophytes. There is a moderate-sized left knee joint effusion.    There is mild left hip osteoarthritis. There is posterior subtalar   osteoarthritis. There are plantar and dorsal calcaneal enthesophytes.    IMPRESSION:    Left knee osteoarthritis, as above.    RENATE SUN MD; Attending Radiologist   This document has been electronically signed. 2022  4:23PM  ---------------------------------------------------------------------------------------------------------------

## 2022-06-08 NOTE — CONSULT NOTE ADULT - CONSULT REASON
hypoxemia; abnormal CXR; morbid obesity; obstructive sleep apnea; hypoxemia; asthma exacerbation; atelectasis; morbid obesity; obstructive sleep apnea; abnormal CXR hypoxemia; asthma exacerbation; atelectasis; pulmonary edema; pleural effusion; morbid obesity; obstructive sleep apnea; abnormal CXR

## 2022-06-09 LAB
GLUCOSE BLDC GLUCOMTR-MCNC: 135 MG/DL — HIGH (ref 70–99)
GLUCOSE BLDC GLUCOMTR-MCNC: 148 MG/DL — HIGH (ref 70–99)
GLUCOSE BLDC GLUCOMTR-MCNC: 167 MG/DL — HIGH (ref 70–99)
GLUCOSE BLDC GLUCOMTR-MCNC: 171 MG/DL — HIGH (ref 70–99)

## 2022-06-09 PROCEDURE — 71250 CT THORAX DX C-: CPT | Mod: 26

## 2022-06-09 RX ADMIN — Medication 975 MILLIGRAM(S): at 05:18

## 2022-06-09 RX ADMIN — Medication 975 MILLIGRAM(S): at 11:29

## 2022-06-09 RX ADMIN — ATORVASTATIN CALCIUM 20 MILLIGRAM(S): 80 TABLET, FILM COATED ORAL at 21:38

## 2022-06-09 RX ADMIN — Medication 325 MILLIGRAM(S): at 17:02

## 2022-06-09 RX ADMIN — PANTOPRAZOLE SODIUM 40 MILLIGRAM(S): 20 TABLET, DELAYED RELEASE ORAL at 05:19

## 2022-06-09 RX ADMIN — CELECOXIB 200 MILLIGRAM(S): 200 CAPSULE ORAL at 06:30

## 2022-06-09 RX ADMIN — Medication 3 MILLILITER(S): at 11:30

## 2022-06-09 RX ADMIN — Medication 1: at 08:56

## 2022-06-09 RX ADMIN — Medication 2.5 MILLIGRAM(S): at 05:20

## 2022-06-09 RX ADMIN — Medication 0.5 MILLIGRAM(S): at 05:19

## 2022-06-09 RX ADMIN — CELECOXIB 200 MILLIGRAM(S): 200 CAPSULE ORAL at 05:19

## 2022-06-09 RX ADMIN — LOSARTAN POTASSIUM 100 MILLIGRAM(S): 100 TABLET, FILM COATED ORAL at 05:19

## 2022-06-09 RX ADMIN — Medication 975 MILLIGRAM(S): at 12:15

## 2022-06-09 RX ADMIN — SENNA PLUS 2 TABLET(S): 8.6 TABLET ORAL at 21:37

## 2022-06-09 RX ADMIN — Medication 975 MILLIGRAM(S): at 06:30

## 2022-06-09 RX ADMIN — Medication 3 MILLILITER(S): at 05:19

## 2022-06-09 RX ADMIN — POLYETHYLENE GLYCOL 3350 17 GRAM(S): 17 POWDER, FOR SOLUTION ORAL at 05:18

## 2022-06-09 RX ADMIN — MONTELUKAST 10 MILLIGRAM(S): 4 TABLET, CHEWABLE ORAL at 11:29

## 2022-06-09 RX ADMIN — CELECOXIB 200 MILLIGRAM(S): 200 CAPSULE ORAL at 17:02

## 2022-06-09 RX ADMIN — Medication 975 MILLIGRAM(S): at 17:01

## 2022-06-09 RX ADMIN — Medication 1: at 13:08

## 2022-06-09 RX ADMIN — Medication 325 MILLIGRAM(S): at 05:18

## 2022-06-09 RX ADMIN — Medication 975 MILLIGRAM(S): at 18:16

## 2022-06-09 RX ADMIN — CELECOXIB 200 MILLIGRAM(S): 200 CAPSULE ORAL at 18:16

## 2022-06-09 RX ADMIN — AMLODIPINE BESYLATE 10 MILLIGRAM(S): 2.5 TABLET ORAL at 05:18

## 2022-06-09 RX ADMIN — Medication 50 MILLIGRAM(S): at 05:20

## 2022-06-09 RX ADMIN — Medication 0.5 MILLIGRAM(S): at 18:17

## 2022-06-09 RX ADMIN — Medication 3 MILLILITER(S): at 17:01

## 2022-06-09 NOTE — PROGRESS NOTE ADULT - ASSESSMENT
ASSESSMENT:    56 year old morbidly obese gentlewoman, lifelong non-smoker, with history of asthma (maintained on Advair and singulair) and obstructive sleep apnea (non-compliant with nocturnal CPAP). She also has a history of HTN, DM and severe osteoarthritis of the left knee making ambulation difficulty. She was admitted electively on June 4th and underwent an uncomplicated left knee replacement under spinal anesthesia. She is now standing by the side of her bed holding on to a walker having just received pain medication. She has an occasional cough productive of scant sputum. She has mild chest congestion and wheeze. She has no fevers, chills or sweats. No chest pain/pressure or palpitations.     CXR -> low lung volumes may be causing prominent bilateral perihilar vascular markings - there is a left basilar/retrocardiac triangular opacity - small right pleural effusion with associated right basilar atelectasis    hypoxemia -> multifactorial  1) left lower lobe atelectasis  2) restrictive lung disease due to morbid obesity limiting chest wall excursion and diaphragmatic excursion  3) asthma exacerbation  4) mild pulmonary edema with (?) bilateral pleural effusions on continuous IV fluids (net (+) 5000cc since admission    PLAN/RECOMMENDATIONS:    oxygen supplementation to keep saturation greater than 92% -> trial on room air now  slept well with nocturnal BIPAP  incentive spirometry - the patient uses the device well  chest CT to better evaluate the lung parenchyma and pleural space  copious diuresis after lasix 40mg x 1  discharge on prednisone 50mg daily x 5 days  albuterol/atrovent nebs q6h  pulmicort 0.5mg nebs q12h - give after duoneb - rinse mouth after use  discharge on her Advair diskus 100/25mcg/inhalation - 1 inhalation 2 times daily  discharge on singulair 10mg at bedtime  cardiac meds: norvasc/indapamide/cozaar/lipitor  GI/DVT prophylaxis: protonix/ASA 325mg 2 times daily/SCDs  analgesics: tylenol ATC/celecoxib  glucose control on steroids  bowel regimen    Will follow with you. Plan of care discussed with the patient at bedside and with the orthopedic team. Follow-up with her outside pulmonologist as scheduled    Royer Watkins MD, [facility]  [phone]  Pulmonary Medicine     ASSESSMENT:    56 year old morbidly obese gentlewoman, lifelong non-smoker, with history of asthma (maintained on Advair and singulair) and obstructive sleep apnea (non-compliant with nocturnal CPAP). She also has a history of HTN, DM and severe osteoarthritis of the left knee making ambulation difficulty. She was admitted electively on June 4th and underwent an uncomplicated left knee replacement under spinal anesthesia. She is now standing by the side of her bed holding on to a walker having just received pain medication. She has an occasional cough productive of scant sputum. She has mild chest congestion and wheeze. She has no fevers, chills or sweats. No chest pain/pressure or palpitations.     CXR -> low lung volumes may be causing prominent bilateral perihilar vascular markings - there is a left basilar/retrocardiac triangular opacity - small right pleural effusion with associated right basilar atelectasis    hypoxemia -> multifactorial  1) left lower lobe atelectasis  2) restrictive lung disease due to morbid obesity limiting chest wall excursion and diaphragmatic excursion  3) asthma exacerbation  4) mild pulmonary edema with (?) bilateral pleural effusions on continuous IV fluids (net (+) 5000cc since admission    PLAN/RECOMMENDATIONS:    oxygen supplementation to keep saturation greater than 92% -> trial on room air now  slept well with nocturnal BIPAP  incentive spirometry - the patient uses the device well  chest CT to better evaluate the lung parenchyma and pleural space -> patchy consolidative opacities at the lung bases which may be due to   infection or aspiration given the posterior dependent location >> atelectasis  copious diuresis after lasix 40mg x 1  discharge on prednisone 50mg daily x 5 days  discharge on levaquin 500mg daily x 5 days  albuterol/atrovent nebs q6h  pulmicort 0.5mg nebs q12h - give after duoneb - rinse mouth after use  discharge on her Advair diskus 100/25mcg/inhalation - 1 inhalation 2 times daily  discharge on singulair 10mg at bedtime  cardiac meds: norvasc/indapamide/cozaar/lipitor  GI/DVT prophylaxis: protonix/ASA 325mg 2 times daily/SCDs  analgesics: tylenol ATC/celecoxib  glucose control on steroids  bowel regimen    Will follow with you. Plan of care discussed with the patient at bedside and with the orthopedic team. No pulmonary objection to discharge. Follow-up with her outside pulmonologist as scheduled    Royer Watkins MD, [place] [facility]  [phone]  Pulmonary Medicine

## 2022-06-09 NOTE — PROGRESS NOTE ADULT - ASSESSMENT
S/P L TKR    Plan    pulmonary consult appreciated  Awaiting CT CXR  OOB/PT/WBAT  Ecotrin for DVT prophylaxis       Karol Cárdenas PA-C   Beeper    [id number]/[id number]

## 2022-06-09 NOTE — PROGRESS NOTE ADULT - SUBJECTIVE AND OBJECTIVE BOX
Patient is a 56y old  Female who presents with a chief complaint of Left total knee replacement (08 Jun 2022 05:42)      POST OPERATIVE DAY #: 3  Patient comfortable receiving resp treatment at present       T(C): 36.6 (06-09-22 @ 05:08), Max: 36.7 (06-08-22 @ 21:33)  HR: 76 (06-09-22 @ 05:27) (76 - 89)  BP: 116/70 (06-09-22 @ 05:08) (100/66 - 133/75)  RR: 18 (06-09-22 @ 05:08) (16 - 18)  SpO2: 98% (06-09-22 @ 05:27) (92% - 98%)  Wt(kg): --    PHYSICAL EXAM:  NAD, Alert  EXT:  Lt Knee:  Aquacel Dressing C/D/I  Calves soft  (+) DF/PF;  EHL FHL:  No gross Sensation deficits noted   (+) Distal Pulses;   B/L, PAS     LABS:                        11.0<L>  8.41  )-----------( 253      ( 07 Jun 2022 06:50 )             35.4     06-07    135  |  97  |  13  ----------------------------<  144<H>  4.1   |  28  |  0.67

## 2022-06-09 NOTE — PROGRESS NOTE ADULT - SUBJECTIVE AND OBJECTIVE BOX
[facility] - PROGRESS NOTE    CHIEF COMPLAINT: hypoxemia; asthma exacerbation; atelectasis; pulmonary edema; pleural effusion; morbid obesity; obstructive sleep apnea; abnormal CXR    INTERVAL HISTORY: awaiting chest CT ordered yesterday - transport is at bedside; copious diuresis after lasix x 1; slept well with BIPAP; using the incentive spirometer frequently; no shortness of breath of hypoxemia on a 2lpm nasal canula; occasional cough productive of scant sputum; ongoing chest congestion and wheeze; no fevers, chills or sweats; no chest pain/pressure or palpitations;      REVIEW OF SYSTEMS:  Constitutional: As per interval history  HEENT: Within normal limits  CV: As per interval history  Resp: As per interval history  GI: Within normal limits   : Within normal limits  Musculoskeletal: s/p left knee replacement  Skin: Within normal limits  Neurological: Within normal limits  Psychiatric: Within normal limits  Endocrine: Within normal limits  Hematologic/Lymphatic: Within normal limits  Allergic/Immunologic: Within normal limits    MEDICATIONS:     Pulmonary "  albuterol/ipratropium for Nebulization 3 milliLiter(s) Nebulizer every 6 hours  buDESOnide    Inhalation Suspension 0.5 milliGRAM(s) Inhalation every 12 hours  montelukast 10 milliGRAM(s) Oral daily    Anti-microbials:    Cardiovascular:  amLODIPine   Tablet 10 milliGRAM(s) Oral daily  indapamide 2.5 milliGRAM(s) Oral daily  losartan 100 milliGRAM(s) Oral daily    Other:  acetaminophen     Tablet .. 975 milliGRAM(s) Oral every 6 hours  acetaminophen   IVPB .. 1000 milliGRAM(s) IV Intermittent once  aspirin enteric coated 325 milliGRAM(s) Oral two times a day  atorvastatin 20 milliGRAM(s) Oral at bedtime  celecoxib 200 milliGRAM(s) Oral every 12 hours  dextrose 5%. 1000 milliLiter(s) IV Continuous <Continuous>  dextrose 5%. 1000 milliLiter(s) IV Continuous <Continuous>  dextrose 50% Injectable 25 Gram(s) IV Push once  dextrose 50% Injectable 12.5 Gram(s) IV Push once  dextrose 50% Injectable 25 Gram(s) IV Push once  glucagon  Injectable 1 milliGRAM(s) IntraMuscular once  insulin lispro (ADMELOG) corrective regimen sliding scale   SubCutaneous three times a day before meals  insulin lispro (ADMELOG) corrective regimen sliding scale   SubCutaneous at bedtime  pantoprazole    Tablet 40 milliGRAM(s) Oral before breakfast  polyethylene glycol 3350 17 Gram(s) Oral at bedtime  predniSONE   Tablet 50 milliGRAM(s) Oral daily  senna 2 Tablet(s) Oral at bedtime    MEDICATIONS  (PRN):  ALBUTerol    90 MICROgram(s) HFA Inhaler 2 Puff(s) Inhalation every 6 hours PRN Shortness of Breath and/or Wheezing  bisacodyl Suppository 10 milliGRAM(s) Rectal once PRN Constipation  dextrose Oral Gel 15 Gram(s) Oral once PRN Blood Glucose LESS THAN 70 milliGRAM(s)/deciliter  magnesium hydroxide Suspension 30 milliLiter(s) Oral daily PRN Constipation  ondansetron Injectable 4 milliGRAM(s) IV Push every 6 hours PRN Nausea and/or Vomiting  traMADol 50 milliGRAM(s) Oral three times a day PRN Moderate Pain (4 - 6)    OBJECTIVE:    I&O's Detail    08 Jun 2022 07:01  -  09 Jun 2022 07:00  --------------------------------------------------------  IN:    Oral Fluid: 600 mL  Total IN: 600 mL    OUT:  Total OUT: 0 mL    Total NET: 600 mL    POCT Blood Glucose.: 166 mg/dL (08 Jun 2022 22:05)  POCT Blood Glucose.: 132 mg/dL (08 Jun 2022 17:52)  POCT Blood Glucose.: 138 mg/dL (08 Jun 2022 11:55)      PHYSICAL EXAM:       ICU Vital Signs Last 24 Hrs  T(C): 36.6 (09 Jun 2022 05:08), Max: 36.7 (08 Jun 2022 21:33)  T(F): 97.8 (09 Jun 2022 05:08), Max: 98.1 (08 Jun 2022 21:33)  HR: 76 (09 Jun 2022 05:27) (76 - 89)  BP: 116/70 (09 Jun 2022 05:08) (100/66 - 133/75)  BP(mean): --  ABP: --  ABP(mean): --  RR: 18 (09 Jun 2022 05:08) (16 - 18)  SpO2: 98% (09 Jun 2022 05:27) (92% - 98%) on 2lpm nasal canula -> 93% on room air     General: Awake. Alert. Cooperative. No distress. Appears stated age. Morbid obesity.	  HEENT: Atraumatic. Normocephalic. Anicteric. Normal oral mucosa. PERRL. EOMI. Mallampati class IV airway.  Neck: Supple. Trachea midline. Thyroid without enlargement/tenderness/nodules. No carotid bruit. No JVD. Short and wide.	  Cardiovascular: Regular rate and rhythm. S1 S2 normal. No murmurs, rubs or gallops.  Respiratory: Respirations unlabored. Bilateral wheeze. No curvature.  Abdomen: Soft. Non-tender. Non-distended. No organomegaly. No masses. Normal bowel sounds.  Extremities: Warm to touch. No clubbing or cyanosis. No pedal edema. Bilateral SCDs. Left knee bandaged  Pulses: 2+ peripheral pulses all extremities.	  Skin: Normal skin color. No rashes or lesions. No ecchymoses. No cyanosis. Warm to touch.  Lymph Nodes: Cervical, supraclavicular and axillary nodes normal  Neurological: Motor and sensory examination equal and normal. A and O x 3  Psychiatry: Appropriate mood and affect.    LABS:      CBC    WBC  8.41 <==    Hemoglobin  11.0 <<==    Hematocrit  35.4 <==    Platelets  253 <==      135  |  97  |  13  ----------------------------<  144<H>    06-07  4.1   |  28  |  0.67      LYTES    sodium  135 <==    potassium   4.1 <==    chloride  97 <==    carbon dioxide  28 <==    =============================================================================================  RENAL FUNCTION:    Creatinine:   0.67  <<==    BUN:   13 <==    ============================================================================================    calcium   9.1 <==    ============================================================================================  MICROBIOLOGY:     COVID-19 PCR . (06.03.22 @ 12:47)   COVID-19 PCR: NotDetec:       RADIOLOGY:  [x ] Chest radiographs reviewed and interpreted by me    EXAM:  XR CHEST PORTABLE URGENT 1V                          PROCEDURE DATE:  06/08/2022      FINDINGS:  Low lung volumes at time film acquisition limits diagnostic certainty.    Heart size is not accurately assessed on this single AP projection due to   magnification, but is questionably enlarged.    Prominent bilateral perihilar vascular markings with left   basilar/retrocardiac triangular opacity. Small right pleural effusion   with associated right basilar atelectasis.    There is no pneumothorax.  No acute bony abnormality.    IMPRESSION:  Borderline cardiac enlargement with prominent bilateral perihilar   vascular markings and left basilar/retrocardiac triangular opacity. Small   right pleural effusion with associated right basilar atelectasis.    Findings are most consistent with pulmonary venous hypertension/pulmonary   edema in this patient with positive fluid balance per EMR review. Left   lower lobe atelectasis/infectious consolidation cannot be excluded on the   basis x-ray alone.    JASMINE BUSTILLOS MD; Resident Radiologist  This document has been electronically signed.  QASIM BONILLA MD; Attending Radiologist  This document has been electronically signed. Jun 8 2022  2:59PM  ---------------------------------------------------------------------------------------------------------------  EXAM:  XR KNEE 1-2 VIEWS LT                          PROCEDURE DATE:  06/06/2022      INTERPRETATION:  CLINICAL INDICATION: baseline postoperative evaluation   status post robot-assisted left total knee replacement for osteoarthritis    EXAM:  Frontal and crosstable lateral left knee from 6/6/2022 at 1917.    IMPRESSION:  Unconstrained left total knee prosthesis implanted.    Intact and aligned hardware and no periprosthetic fractures.    Postoperative soft tissue changes.    Correlate with intraoperative findings.    JOVANNY DOBBINS MD; Attending Radiologist  This document has been electronically signed. Jun 7 2022  8:49AM  ---------------------------------------------------------------------------------------------------------------  CT KNEE ONLY LT  - ORDERED BY: EVITA DIAZ    PROCEDURE DATE:  04/13/2022       INTERPRETATION:  EXAMINATION: CT left knee without contrast    CLINICAL INFORMATION: Left knee pain. Left knee osteoarthritis.   Preoperative knee replacement.    TECHNIQUE: Imaging was performed as per the [facility] protocol. Axial CT   images were obtained through the left knee. Coronal and sagittal   reformatted images were made. Axial images were also obtained through the   left hip and ankle    FINDINGS: There is left knee osteoarthritis, most pronounced in the   lateral compartment where there is severe joint space narrowing with   subchondral cystic change and sclerosis. There are tricompartmental   osteophytes. There is a moderate-sized left knee joint effusion.    There is mild left hip osteoarthritis. There is posterior subtalar   osteoarthritis. There are plantar and dorsal calcaneal enthesophytes.    IMPRESSION:    Left knee osteoarthritis, as above.    RENATE SUN MD; Attending Radiologist   This document has been electronically signed. Apr 18 2022  4:23PM  ---------------------------------------------------------------------------------------------------------------       [facility] - PROGRESS NOTE    CHIEF COMPLAINT: hypoxemia; asthma exacerbation; atelectasis; pulmonary edema; pleural effusion; morbid obesity; obstructive sleep apnea; abnormal CXR    INTERVAL HISTORY: awaiting chest CT ordered yesterday - transport is at bedside; copious diuresis after lasix x 1; slept well with BIPAP; using the incentive spirometer frequently; no shortness of breath of hypoxemia on a 2lpm nasal canula; occasional cough productive of scant sputum; ongoing chest congestion and wheeze; no fevers, chills or sweats; no chest pain/pressure or palpitations;      REVIEW OF SYSTEMS:  Constitutional: As per interval history  HEENT: Within normal limits  CV: As per interval history  Resp: As per interval history  GI: Within normal limits   : Within normal limits  Musculoskeletal: s/p left knee replacement  Skin: Within normal limits  Neurological: Within normal limits  Psychiatric: Within normal limits  Endocrine: Within normal limits  Hematologic/Lymphatic: Within normal limits  Allergic/Immunologic: Within normal limits    MEDICATIONS:     Pulmonary "  albuterol/ipratropium for Nebulization 3 milliLiter(s) Nebulizer every 6 hours  buDESOnide    Inhalation Suspension 0.5 milliGRAM(s) Inhalation every 12 hours  montelukast 10 milliGRAM(s) Oral daily    Anti-microbials:    Cardiovascular:  amLODIPine   Tablet 10 milliGRAM(s) Oral daily  indapamide 2.5 milliGRAM(s) Oral daily  losartan 100 milliGRAM(s) Oral daily    Other:  acetaminophen     Tablet .. 975 milliGRAM(s) Oral every 6 hours  acetaminophen   IVPB .. 1000 milliGRAM(s) IV Intermittent once  aspirin enteric coated 325 milliGRAM(s) Oral two times a day  atorvastatin 20 milliGRAM(s) Oral at bedtime  celecoxib 200 milliGRAM(s) Oral every 12 hours  dextrose 5%. 1000 milliLiter(s) IV Continuous <Continuous>  dextrose 5%. 1000 milliLiter(s) IV Continuous <Continuous>  dextrose 50% Injectable 25 Gram(s) IV Push once  dextrose 50% Injectable 12.5 Gram(s) IV Push once  dextrose 50% Injectable 25 Gram(s) IV Push once  glucagon  Injectable 1 milliGRAM(s) IntraMuscular once  insulin lispro (ADMELOG) corrective regimen sliding scale   SubCutaneous three times a day before meals  insulin lispro (ADMELOG) corrective regimen sliding scale   SubCutaneous at bedtime  pantoprazole    Tablet 40 milliGRAM(s) Oral before breakfast  polyethylene glycol 3350 17 Gram(s) Oral at bedtime  predniSONE   Tablet 50 milliGRAM(s) Oral daily  senna 2 Tablet(s) Oral at bedtime    MEDICATIONS  (PRN):  ALBUTerol    90 MICROgram(s) HFA Inhaler 2 Puff(s) Inhalation every 6 hours PRN Shortness of Breath and/or Wheezing  bisacodyl Suppository 10 milliGRAM(s) Rectal once PRN Constipation  dextrose Oral Gel 15 Gram(s) Oral once PRN Blood Glucose LESS THAN 70 milliGRAM(s)/deciliter  magnesium hydroxide Suspension 30 milliLiter(s) Oral daily PRN Constipation  ondansetron Injectable 4 milliGRAM(s) IV Push every 6 hours PRN Nausea and/or Vomiting  traMADol 50 milliGRAM(s) Oral three times a day PRN Moderate Pain (4 - 6)    OBJECTIVE:    I&O's Detail    08 Jun 2022 07:01  -  09 Jun 2022 07:00  --------------------------------------------------------  IN:    Oral Fluid: 600 mL  Total IN: 600 mL    OUT:  Total OUT: 0 mL    Total NET: 600 mL    POCT Blood Glucose.: 166 mg/dL (08 Jun 2022 22:05)  POCT Blood Glucose.: 132 mg/dL (08 Jun 2022 17:52)  POCT Blood Glucose.: 138 mg/dL (08 Jun 2022 11:55)      PHYSICAL EXAM:       ICU Vital Signs Last 24 Hrs  T(C): 36.6 (09 Jun 2022 05:08), Max: 36.7 (08 Jun 2022 21:33)  T(F): 97.8 (09 Jun 2022 05:08), Max: 98.1 (08 Jun 2022 21:33)  HR: 76 (09 Jun 2022 05:27) (76 - 89)  BP: 116/70 (09 Jun 2022 05:08) (100/66 - 133/75)  BP(mean): --  ABP: --  ABP(mean): --  RR: 18 (09 Jun 2022 05:08) (16 - 18)  SpO2: 98% (09 Jun 2022 05:27) (92% - 98%) on 2lpm nasal canula -> 93% on room air     General: Awake. Alert. Cooperative. No distress. Appears stated age. Morbid obesity.	  HEENT: Atraumatic. Normocephalic. Anicteric. Normal oral mucosa. PERRL. EOMI. Mallampati class IV airway.  Neck: Supple. Trachea midline. Thyroid without enlargement/tenderness/nodules. No carotid bruit. No JVD. Short and wide.	  Cardiovascular: Regular rate and rhythm. S1 S2 normal. No murmurs, rubs or gallops.  Respiratory: Respirations unlabored. Bilateral wheeze. No curvature.  Abdomen: Soft. Non-tender. Non-distended. No organomegaly. No masses. Normal bowel sounds.  Extremities: Warm to touch. No clubbing or cyanosis. No pedal edema. Bilateral SCDs. Left knee bandaged  Pulses: 2+ peripheral pulses all extremities.	  Skin: Normal skin color. No rashes or lesions. No ecchymoses. No cyanosis. Warm to touch.  Lymph Nodes: Cervical, supraclavicular and axillary nodes normal  Neurological: Motor and sensory examination equal and normal. A and O x 3  Psychiatry: Appropriate mood and affect.    LABS:      CBC    WBC  8.41 <==    Hemoglobin  11.0 <<==    Hematocrit  35.4 <==    Platelets  253 <==      135  |  97  |  13  ----------------------------<  144<H>    06-07  4.1   |  28  |  0.67      LYTES    sodium  135 <==    potassium   4.1 <==    chloride  97 <==    carbon dioxide  28 <==    =============================================================================================  RENAL FUNCTION:    Creatinine:   0.67  <<==    BUN:   13 <==    ============================================================================================    calcium   9.1 <==    ============================================================================================  MICROBIOLOGY:     COVID-19 PCR . (06.03.22 @ 12:47)   COVID-19 PCR: NotDetec:       RADIOLOGY:  [x ] Chest radiographs reviewed and interpreted by me    EXAM:  CT CHEST                          PROCEDURE DATE:  06/09/2022      INTERPRETATION:  CLINICAL INFORMATION: Wheezing, congestion and hypoxemia    FINDINGS:    LUNGS AND AIRWAYS: Patent central airways.  Patchy bilateral lower lobe   consolidative opacities, left greater than right.  No empyema.  PLEURA: No pleural effusion.  MEDIASTINUM AND LIZETH: Prominent bilateral axillary lymph nodes likely   reactive. No lymphadenopathy.  VESSELS: Aortic and coronary artery calcifications.  HEART: Heart size is normal. No pericardial effusion.  CHEST WALL AND LOWER NECK: Within normal limits.  VISUALIZED UPPER ABDOMEN: Within normal limits.  BONES: Degenerative changes.    IMPRESSION:  Patchy consolidative opacities at the lung bases, which may be due to   infection or aspiration given the posterior dependent location. No   empyema.    Recommend repeat imaging in 4-6 weeks for follow-up or as clinically   warranted.    GERI PRADHAN DO;Resident Radiologist  This document has been electronically signed.  CHRISTINE ALMONTE MD; Attending Radiologist  This document has been electronically signed. Jun 9 2022 11:46AM  ---------------------------------------------------------------------------------------------------------------  EXAM:  XR CHEST PORTABLE URGENT 1V                          PROCEDURE DATE:  06/08/2022      FINDINGS:  Low lung volumes at time film acquisition limits diagnostic certainty.    Heart size is not accurately assessed on this single AP projection due to   magnification, but is questionably enlarged.    Prominent bilateral perihilar vascular markings with left   basilar/retrocardiac triangular opacity. Small right pleural effusion   with associated right basilar atelectasis.    There is no pneumothorax.  No acute bony abnormality.    IMPRESSION:  Borderline cardiac enlargement with prominent bilateral perihilar   vascular markings and left basilar/retrocardiac triangular opacity. Small   right pleural effusion with associated right basilar atelectasis.    Findings are most consistent with pulmonary venous hypertension/pulmonary   edema in this patient with positive fluid balance per EMR review. Left   lower lobe atelectasis/infectious consolidation cannot be excluded on the   basis x-ray alone.    JASMINE BUSTILLOS MD; Resident Radiologist  This document has been electronically signed.  QASIM BONILLA MD; Attending Radiologist  This document has been electronically signed. Jun 8 2022  2:59PM  ---------------------------------------------------------------------------------------------------------------  EXAM:  XR KNEE 1-2 VIEWS LT                          PROCEDURE DATE:  06/06/2022      INTERPRETATION:  CLINICAL INDICATION: baseline postoperative evaluation   status post robot-assisted left total knee replacement for osteoarthritis    EXAM:  Frontal and crosstable lateral left knee from 6/6/2022 at 1917.    IMPRESSION:  Unconstrained left total knee prosthesis implanted.    Intact and aligned hardware and no periprosthetic fractures.    Postoperative soft tissue changes.    Correlate with intraoperative findings.    JOVANNY DOBBINS MD; Attending Radiologist  This document has been electronically signed. Jun 7 2022  8:49AM  ---------------------------------------------------------------------------------------------------------------  CT KNEE ONLY LT  - ORDERED BY: EVITA DIAZ    PROCEDURE DATE:  04/13/2022       INTERPRETATION:  EXAMINATION: CT left knee without contrast    CLINICAL INFORMATION: Left knee pain. Left knee osteoarthritis.   Preoperative knee replacement.    TECHNIQUE: Imaging was performed as per the [facility] protocol. Axial CT   images were obtained through the left knee. Coronal and sagittal   reformatted images were made. Axial images were also obtained through the   left hip and ankle    FINDINGS: There is left knee osteoarthritis, most pronounced in the   lateral compartment where there is severe joint space narrowing with   subchondral cystic change and sclerosis. There are tricompartmental   osteophytes. There is a moderate-sized left knee joint effusion.    There is mild left hip osteoarthritis. There is posterior subtalar   osteoarthritis. There are plantar and dorsal calcaneal enthesophytes.    IMPRESSION:    Left knee osteoarthritis, as above.    RENATE SUN MD; Attending Radiologist   This document has been electronically signed. Apr 18 2022  4:23PM  ---------------------------------------------------------------------------------------------------------------

## 2022-06-10 VITALS — HEART RATE: 78 BPM | OXYGEN SATURATION: 95 %

## 2022-06-10 LAB
GLUCOSE BLDC GLUCOMTR-MCNC: 116 MG/DL — HIGH (ref 70–99)
GLUCOSE BLDC GLUCOMTR-MCNC: 138 MG/DL — HIGH (ref 70–99)

## 2022-06-10 PROCEDURE — 80048 BASIC METABOLIC PNL TOTAL CA: CPT

## 2022-06-10 PROCEDURE — 97165 OT EVAL LOW COMPLEX 30 MIN: CPT

## 2022-06-10 PROCEDURE — 82962 GLUCOSE BLOOD TEST: CPT

## 2022-06-10 PROCEDURE — 86901 BLOOD TYPING SEROLOGIC RH(D): CPT

## 2022-06-10 PROCEDURE — C9803: CPT

## 2022-06-10 PROCEDURE — 97116 GAIT TRAINING THERAPY: CPT

## 2022-06-10 PROCEDURE — 86900 BLOOD TYPING SEROLOGIC ABO: CPT

## 2022-06-10 PROCEDURE — C1713: CPT

## 2022-06-10 PROCEDURE — U0005: CPT

## 2022-06-10 PROCEDURE — 97162 PT EVAL MOD COMPLEX 30 MIN: CPT

## 2022-06-10 PROCEDURE — 94640 AIRWAY INHALATION TREATMENT: CPT

## 2022-06-10 PROCEDURE — 71250 CT THORAX DX C-: CPT

## 2022-06-10 PROCEDURE — 81025 URINE PREGNANCY TEST: CPT

## 2022-06-10 PROCEDURE — 86850 RBC ANTIBODY SCREEN: CPT

## 2022-06-10 PROCEDURE — 85027 COMPLETE CBC AUTOMATED: CPT

## 2022-06-10 PROCEDURE — 71045 X-RAY EXAM CHEST 1 VIEW: CPT

## 2022-06-10 PROCEDURE — C1776: CPT

## 2022-06-10 PROCEDURE — 73560 X-RAY EXAM OF KNEE 1 OR 2: CPT

## 2022-06-10 PROCEDURE — S2900: CPT

## 2022-06-10 PROCEDURE — U0003: CPT

## 2022-06-10 PROCEDURE — 97110 THERAPEUTIC EXERCISES: CPT

## 2022-06-10 PROCEDURE — 94660 CPAP INITIATION&MGMT: CPT

## 2022-06-10 RX ORDER — SENNA PLUS 8.6 MG/1
2 TABLET ORAL
Qty: 0 | Refills: 0 | DISCHARGE
Start: 2022-06-10

## 2022-06-10 RX ORDER — ASPIRIN/CALCIUM CARB/MAGNESIUM 324 MG
1 TABLET ORAL
Qty: 0 | Refills: 0 | DISCHARGE
Start: 2022-06-10

## 2022-06-10 RX ORDER — TRAMADOL HYDROCHLORIDE 50 MG/1
1 TABLET ORAL
Qty: 0 | Refills: 0 | DISCHARGE
Start: 2022-06-10

## 2022-06-10 RX ORDER — POLYETHYLENE GLYCOL 3350 17 G/17G
17 POWDER, FOR SOLUTION ORAL
Qty: 0 | Refills: 0 | DISCHARGE
Start: 2022-06-10

## 2022-06-10 RX ORDER — ACETAMINOPHEN 500 MG
3 TABLET ORAL
Qty: 0 | Refills: 0 | DISCHARGE
Start: 2022-06-10

## 2022-06-10 RX ORDER — TRAMADOL HYDROCHLORIDE 50 MG/1
1 TABLET ORAL
Qty: 21 | Refills: 0
Start: 2022-06-10 | End: 2022-06-16

## 2022-06-10 RX ORDER — ASPIRIN/CALCIUM CARB/MAGNESIUM 324 MG
1 TABLET ORAL
Qty: 84 | Refills: 0
Start: 2022-06-10 | End: 2022-07-21

## 2022-06-10 RX ADMIN — CELECOXIB 200 MILLIGRAM(S): 200 CAPSULE ORAL at 07:34

## 2022-06-10 RX ADMIN — Medication 975 MILLIGRAM(S): at 00:31

## 2022-06-10 RX ADMIN — Medication 0.5 MILLIGRAM(S): at 06:38

## 2022-06-10 RX ADMIN — Medication 2.5 MILLIGRAM(S): at 09:16

## 2022-06-10 RX ADMIN — POLYETHYLENE GLYCOL 3350 17 GRAM(S): 17 POWDER, FOR SOLUTION ORAL at 06:38

## 2022-06-10 RX ADMIN — Medication 975 MILLIGRAM(S): at 01:31

## 2022-06-10 RX ADMIN — AMLODIPINE BESYLATE 10 MILLIGRAM(S): 2.5 TABLET ORAL at 06:37

## 2022-06-10 RX ADMIN — TRAMADOL HYDROCHLORIDE 50 MILLIGRAM(S): 50 TABLET ORAL at 09:05

## 2022-06-10 RX ADMIN — Medication 975 MILLIGRAM(S): at 12:45

## 2022-06-10 RX ADMIN — Medication 975 MILLIGRAM(S): at 06:37

## 2022-06-10 RX ADMIN — PANTOPRAZOLE SODIUM 40 MILLIGRAM(S): 20 TABLET, DELAYED RELEASE ORAL at 06:36

## 2022-06-10 RX ADMIN — Medication 325 MILLIGRAM(S): at 06:38

## 2022-06-10 RX ADMIN — CELECOXIB 200 MILLIGRAM(S): 200 CAPSULE ORAL at 06:37

## 2022-06-10 RX ADMIN — Medication 3 MILLILITER(S): at 12:18

## 2022-06-10 RX ADMIN — MONTELUKAST 10 MILLIGRAM(S): 4 TABLET, CHEWABLE ORAL at 12:12

## 2022-06-10 RX ADMIN — Medication 975 MILLIGRAM(S): at 07:34

## 2022-06-10 RX ADMIN — Medication 975 MILLIGRAM(S): at 12:12

## 2022-06-10 RX ADMIN — Medication 3 MILLILITER(S): at 06:37

## 2022-06-10 RX ADMIN — Medication 50 MILLIGRAM(S): at 06:38

## 2022-06-10 NOTE — PROGRESS NOTE ADULT - ASSESSMENT
S/P L TKR    Plan    continue pulmonary recommendations  OOB/PT/WBAT  Anticipate d/c today     Karol Cárdenas PA-C   Beeper    [id number]

## 2022-06-10 NOTE — PROGRESS NOTE ADULT - SUBJECTIVE AND OBJECTIVE BOX
Patient is a 56y old  Female who presents with a chief complaint of Left total knee replacement (08 Jun 2022 05:42)      POST OPERATIVE DAY #:  4  Patient comfortable  No complaints, denies any SOB    T(C): 36.5 (06-10-22 @ 05:32), Max: 36.6 (06-09-22 @ 12:00)  HR: 68 (06-10-22 @ 05:32) (64 - 71)  BP: 103/62 (06-10-22 @ 05:32) (103/62 - 129/82)  RR: 18 (06-10-22 @ 05:32) (18 - 18)  SpO2: 98% (06-10-22 @ 05:32) (94% - 99%)  Wt(kg): --    PHYSICAL EXAM:  NAD, Alert  EXT:  Lt Knee: Aquacel Dressing C/D/I  Calves soft  (+) DF/PF;  EHL FHL:  No gross Sensation deficits noted   (+) Distal Pulses;   B/L, PAS

## 2022-06-10 NOTE — PROGRESS NOTE ADULT - ASSESSMENT
ASSESSMENT:    56 year old obese gentlewoman, lifelong non-smoker, with history of asthma (maintained on Advair, singulair and albuterol MDI as needed) and obstructive sleep apnea (non-compliant with nocturnal CPAP). She also has a history of HTN, DM and severe osteoarthritis of the left knee making ambulation difficulty. She was admitted electively on June 4th and underwent an uncomplicated left knee replacement under spinal anesthesia. She is now standing by the side of her bed holding on to a walker having just received pain medication. She has an occasional cough productive of scant sputum. She has chest congestion and wheeze. She has no fevers, chills or sweats. No chest pain/pressure or palpitations.     CXR -> low lung volumes may be causing prominent bilateral perihilar vascular markings - there is a left basilar/retrocardiac triangular opacity - small right pleural effusion with associated right basilar atelectasis    hypoxemia -> multifactorial  1) bibasilar atelectasis  2) restrictive lung disease due to morbid obesity limiting chest wall excursion and diaphragmatic excursion  3) asthma exacerbation    PLAN/RECOMMENDATIONS:    oxygen supplementation to keep saturation greater than 92% -> currently stable on room air at rest and with exertion  slept well with nocturnal BIPAP  incentive spirometry - the patient uses the device well  chest CT -> patchy consolidative opacities at the lung bases which may be due to infection or aspiration given the posterior dependent location >> atelectasis  discharge on prednisone 50mg daily x 5 days  discharge on levaquin 500mg daily x 5 days - patient informed of the risk of tendonitis and tendon rupture on quinolones especially in conjunction with steroids but her multiple antibiotic allergies limit our options  discharge on singulair 10mg at bedtime  discontinue albuterol/atrovent nebs when discharged  discontinue pulmicort 0.5mg nebs q12h when discharged  discharge on Advair diskus 100/25mcg/inhalation - 1 inhalation 2 times daily  discharge on singulair 10mg at bedtime  discharge on albuterol MDI 2 puffs q4h as needed for dyspnea, cough or bronchospasm  cardiac meds: norvasc/indapamide/cozaar/lipitor  GI/DVT prophylaxis: protonix/ASA 325mg 2 times daily  analgesics: tylenol ATC/celecoxib  glucose control on steroids  bowel regimen    Will follow with you. Plan of care discussed with the patient at bedside and with the orthopedic team. No pulmonary objection to discharge. Follow-up with her outside pulmonologist as scheduled.    Royer Watkins MD, [facility]  [phone]  Pulmonary Medicine

## 2022-06-10 NOTE — PROGRESS NOTE ADULT - SUBJECTIVE AND OBJECTIVE BOX
[facility] - PROGRESS NOTE      CHIEF COMPLAINT: hypoxemia; asthma exacerbation; atelectasis; pneumonia; morbid obesity; obstructive sleep apnea; abnormal CXR    INTERVAL HISTORY: chest CT -> bibasilar pneumonia >> atelectasis; slept well with BIPAP; using the incentive spirometer frequently; no shortness of breath of hypoxemia on room air; occasional cough productive of scant sputum; ongoing chest congestion and wheeze; no fevers, chills or sweats; no chest pain/pressure or palpitations;      REVIEW OF SYSTEMS:  Constitutional: As per interval history  HEENT: Within normal limits  CV: As per interval history  Resp: As per interval history  GI: Within normal limits   : Within normal limits  Musculoskeletal: s/p left knee replacement  Skin: Within normal limits  Neurological: Within normal limits  Psychiatric: Within normal limits  Endocrine: Within normal limits  Hematologic/Lymphatic: Within normal limits  Allergic/Immunologic: Within normal limits    MEDICATIONS:     Pulmonary "  albuterol/ipratropium for Nebulization 3 milliLiter(s) Nebulizer every 6 hours  buDESOnide    Inhalation Suspension 0.5 milliGRAM(s) Inhalation every 12 hours  montelukast 10 milliGRAM(s) Oral daily    Anti-microbials:  levoFLOXacin  Tablet 500 milliGRAM(s) Oral every 24 hours    Cardiovascular:  amLODIPine   Tablet 10 milliGRAM(s) Oral daily  indapamide 2.5 milliGRAM(s) Oral daily  losartan 100 milliGRAM(s) Oral daily    Other:  acetaminophen     Tablet .. 975 milliGRAM(s) Oral every 6 hours  acetaminophen   IVPB .. 1000 milliGRAM(s) IV Intermittent once  aspirin enteric coated 325 milliGRAM(s) Oral two times a day  atorvastatin 20 milliGRAM(s) Oral at bedtime  celecoxib 200 milliGRAM(s) Oral every 12 hours  dextrose 5%. 1000 milliLiter(s) IV Continuous <Continuous>  dextrose 5%. 1000 milliLiter(s) IV Continuous <Continuous>  dextrose 50% Injectable 25 Gram(s) IV Push once  dextrose 50% Injectable 12.5 Gram(s) IV Push once  dextrose 50% Injectable 25 Gram(s) IV Push once  glucagon  Injectable 1 milliGRAM(s) IntraMuscular once  insulin lispro (ADMELOG) corrective regimen sliding scale   SubCutaneous three times a day before meals  insulin lispro (ADMELOG) corrective regimen sliding scale   SubCutaneous at bedtime  pantoprazole    Tablet 40 milliGRAM(s) Oral before breakfast  polyethylene glycol 3350 17 Gram(s) Oral at bedtime  predniSONE   Tablet 50 milliGRAM(s) Oral daily  senna 2 Tablet(s) Oral at bedtime    MEDICATIONS  (PRN):  ALBUTerol    90 MICROgram(s) HFA Inhaler 2 Puff(s) Inhalation every 6 hours PRN Shortness of Breath and/or Wheezing  bisacodyl Suppository 10 milliGRAM(s) Rectal once PRN Constipation  dextrose Oral Gel 15 Gram(s) Oral once PRN Blood Glucose LESS THAN 70 milliGRAM(s)/deciliter  magnesium hydroxide Suspension 30 milliLiter(s) Oral daily PRN Constipation  ondansetron Injectable 4 milliGRAM(s) IV Push every 6 hours PRN Nausea and/or Vomiting  traMADol 50 milliGRAM(s) Oral three times a day PRN Moderate Pain (4 - 6)    OBJECTIVE:    I&O's Detail    09 Jun 2022 07:01  -  10 Jelani 2022 07:00  --------------------------------------------------------  IN:    Oral Fluid: 810 mL  Total IN: 810 mL    OUT:  Total OUT: 0 mL    Total NET: 810 mL    POCT Blood Glucose.: 116 mg/dL (10 Jelani 2022 08:12)  POCT Blood Glucose.: 135 mg/dL (09 Jun 2022 21:37)  POCT Blood Glucose.: 148 mg/dL (09 Jun 2022 18:19)  POCT Blood Glucose.: 167 mg/dL (09 Jun 2022 12:26)  POCT Blood Glucose.: 171 mg/dL (09 Jun 2022 08:51)      PHYSICAL EXAM:       ICU Vital Signs Last 24 Hrs  T(C): 36.5 (10 Jelani 2022 05:32), Max: 36.6 (09 Jun 2022 12:00)  T(F): 97.7 (10 Jelani 2022 05:32), Max: 97.9 (09 Jun 2022 12:00)  HR: 74 (10 Jelani 2022 06:31) (64 - 74)  BP: 103/62 (10 Jelani 2022 05:32) (103/62 - 129/82)  BP(mean): --  ABP: --  ABP(mean): --  RR: 18 (10 Jelani 2022 05:32) (18 - 18)  SpO2: 96% (10 Jelani 2022 06:31) (94% - 99%) on room air     General: Awake. Alert. Cooperative. No distress. Appears stated age. Morbid obesity.	  HEENT: Atraumatic. Normocephalic. Anicteric. Normal oral mucosa. PERRL. EOMI. Mallampati class IV airway.  Neck: Supple. Trachea midline. Thyroid without enlargement/tenderness/nodules. No carotid bruit. No JVD. Short and wide.	  Cardiovascular: Regular rate and rhythm. S1 S2 normal. No murmurs, rubs or gallops.  Respiratory: Respirations unlabored. Bilateral wheeze. No curvature.  Abdomen: Soft. Non-tender. Non-distended. No organomegaly. No masses. Normal bowel sounds.  Extremities: Warm to touch. No clubbing or cyanosis. No pedal edema. Left knee bandaged  Pulses: 2+ peripheral pulses all extremities.	  Skin: Normal skin color. No rashes or lesions. No ecchymoses. No cyanosis. Warm to touch.  Lymph Nodes: Cervical, supraclavicular and axillary nodes normal  Neurological: Motor and sensory examination equal and normal. A and O x 3  Psychiatry: Appropriate mood and affect.    LABS:      CBC    WBC  8.41 <==    Hemoglobin  11.0 <<==    Hematocrit  35.4 <==    Platelets  253 <==      135  |  97  |  13  ----------------------------<  144<H>    06-07  4.1   |  28  |  0.67      LYTES    sodium  135 <==    potassium   4.1 <==    chloride  97 <==    carbon dioxide  28 <==    =============================================================================================  RENAL FUNCTION:    Creatinine:   0.67  <<==    BUN:   13 <==    ============================================================================================    calcium   9.1 <==    ============================================================================================  MICROBIOLOGY:     COVID-19 PCR . (06.03.22 @ 12:47)   COVID-19 PCR: NotDetec:       RADIOLOGY:  [x ] Chest radiographs reviewed and interpreted by me    EXAM:  CT CHEST                          PROCEDURE DATE:  06/09/2022      INTERPRETATION:  CLINICAL INFORMATION: Wheezing, congestion and hypoxemia    FINDINGS:    LUNGS AND AIRWAYS: Patent central airways.  Patchy bilateral lower lobe   consolidative opacities, left greater than right.  No empyema.  PLEURA: No pleural effusion.  MEDIASTINUM AND LIZETH: Prominent bilateral axillary lymph nodes likely   reactive. No lymphadenopathy.  VESSELS: Aortic and coronary artery calcifications.  HEART: Heart size is normal. No pericardial effusion.  CHEST WALL AND LOWER NECK: Within normal limits.  VISUALIZED UPPER ABDOMEN: Within normal limits.  BONES: Degenerative changes.    IMPRESSION:  Patchy consolidative opacities at the lung bases, which may be due to   infection or aspiration given the posterior dependent location. No   empyema.    Recommend repeat imaging in 4-6 weeks for follow-up or as clinically   warranted.    GERI PRADHAN DO;Resident Radiologist  This document has been electronically signed.  CHRISTINE ALMONTE MD; Attending Radiologist  This document has been electronically signed. Jun 9 2022 11:46AM  ---------------------------------------------------------------------------------------------------------------  EXAM:  XR CHEST PORTABLE URGENT 1V                          PROCEDURE DATE:  06/08/2022      FINDINGS:  Low lung volumes at time film acquisition limits diagnostic certainty.    Heart size is not accurately assessed on this single AP projection due to   magnification, but is questionably enlarged.    Prominent bilateral perihilar vascular markings with left   basilar/retrocardiac triangular opacity. Small right pleural effusion   with associated right basilar atelectasis.    There is no pneumothorax.  No acute bony abnormality.    IMPRESSION:  Borderline cardiac enlargement with prominent bilateral perihilar   vascular markings and left basilar/retrocardiac triangular opacity. Small   right pleural effusion with associated right basilar atelectasis.    Findings are most consistent with pulmonary venous hypertension/pulmonary   edema in this patient with positive fluid balance per EMR review. Left   lower lobe atelectasis/infectious consolidation cannot be excluded on the   basis x-ray alone.    JASMINE BUSTILLOS MD; Resident Radiologist  This document has been electronically signed.  QASIM BONILLA MD; Attending Radiologist  This document has been electronically signed. Jun 8 2022  2:59PM  ---------------------------------------------------------------------------------------------------------------  EXAM:  XR KNEE 1-2 VIEWS LT                          PROCEDURE DATE:  06/06/2022      INTERPRETATION:  CLINICAL INDICATION: baseline postoperative evaluation   status post robot-assisted left total knee replacement for osteoarthritis    EXAM:  Frontal and crosstable lateral left knee from 6/6/2022 at 1917.    IMPRESSION:  Unconstrained left total knee prosthesis implanted.    Intact and aligned hardware and no periprosthetic fractures.    Postoperative soft tissue changes.    Correlate with intraoperative findings.    JOVANNY DOBBINS MD; Attending Radiologist  This document has been electronically signed. Jun 7 2022  8:49AM  ---------------------------------------------------------------------------------------------------------------  CT KNEE ONLY LT  - ORDERED BY: EVITA DIAZ    PROCEDURE DATE:  04/13/2022       INTERPRETATION:  EXAMINATION: CT left knee without contrast    CLINICAL INFORMATION: Left knee pain. Left knee osteoarthritis.   Preoperative knee replacement.    TECHNIQUE: Imaging was performed as per the [facility]ONARD protocol. Axial CT   images were obtained through the left knee. Coronal and sagittal   reformatted images were made. Axial images were also obtained through the   left hip and ankle    FINDINGS: There is left knee osteoarthritis, most pronounced in the   lateral compartment where there is severe joint space narrowing with   subchondral cystic change and sclerosis. There are tricompartmental   osteophytes. There is a moderate-sized left knee joint effusion.    There is mild left hip osteoarthritis. There is posterior subtalar   osteoarthritis. There are plantar and dorsal calcaneal enthesophytes.    IMPRESSION:    Left knee osteoarthritis, as above.    RENATE SUN MD; Attending Radiologist   This document has been electronically signed. Apr 18 2022  4:23PM  ---------------------------------------------------------------------------------------------------------------

## 2022-06-13 PROBLEM — J45.909 UNSPECIFIED ASTHMA, UNCOMPLICATED: Chronic | Status: ACTIVE | Noted: 2022-05-16

## 2022-06-13 PROBLEM — M25.562 PAIN IN LEFT KNEE: Chronic | Status: ACTIVE | Noted: 2022-05-16

## 2022-06-13 PROBLEM — M17.12 UNILATERAL PRIMARY OSTEOARTHRITIS, LEFT KNEE: Chronic | Status: ACTIVE | Noted: 2022-05-16

## 2022-06-13 PROBLEM — I10 ESSENTIAL (PRIMARY) HYPERTENSION: Chronic | Status: ACTIVE | Noted: 2022-05-16

## 2022-06-13 PROBLEM — E11.9 TYPE 2 DIABETES MELLITUS WITHOUT COMPLICATIONS: Chronic | Status: ACTIVE | Noted: 2022-05-16

## 2022-06-14 ENCOUNTER — NON-APPOINTMENT (OUTPATIENT)
Age: 57
End: 2022-06-14

## 2022-06-20 ENCOUNTER — APPOINTMENT (OUTPATIENT)
Dept: PULMONOLOGY | Facility: CLINIC | Age: 57
End: 2022-06-20
Payer: MEDICARE

## 2022-06-20 DIAGNOSIS — G47.33 OBSTRUCTIVE SLEEP APNEA (ADULT) (PEDIATRIC): ICD-10-CM

## 2022-06-20 PROCEDURE — 99214 OFFICE O/P EST MOD 30 MIN: CPT | Mod: 95

## 2022-06-20 NOTE — HISTORY OF PRESENT ILLNESS
[Home] : at home, [unfilled] , at the time of the visit. [Medical Office: ([facility])___] : at the medical office located in  [Verbal consent obtained from patient] : the patient, [unfilled] [FreeTextEntry1] : 56 year old female asthma, mild BIRD, nocturnal hypoventilation on bilevel, here for a follow up visit.\par \par Comorbid medical conditions include hypertension, diabetes, osteoarthritis, and class III obesity. Recently had left knee arthroplasty.  \par \par Previously diagnosed with BIRD and was on CPAP ~ 12 years ago but could not tolerate. \par HST (8/17/2020) PRAVIN of 12.6/hr, T90 of 52.6% with baseline saturation 89-91% suggestive of nocturnal hypoventilation. Bilevel titration (4/1/2021) showed persistent hypercapnia and hypoxemia despite optimal bilevel and a Trilogy device was ordered but denied coverage so the decision was made to treat with bilevel S at a pressure of 20/10 cm H2O and eventually check nocturnal oximetry on these settings. However, due to the recall device was stopped.  Repeat HST (11/17/2021) showed an AHI of 24.6/hr and T90 of 62.2%  Device was returned due to not meeting compliance. According to Medicare guidelines she needs repeat work-up and testing to get another device issue which is the reason for today's visit.  She felt improvement in symptoms when using the device previously. Current weight is 261 pounds.  Prior DME: [facility].

## 2022-06-20 NOTE — ASSESSMENT
[FreeTextEntry1] : 56 year old female with BIRD and nocturnal hypoventilation here for follow-up visit.  Her device had to be returned due to non compliance during the initial compliance period.  She is symptomatic and would like to pursue therapy once again.  She was referred to the [facility] for a diagnostic HST and will likely need an in lab titration following the study.

## 2022-06-20 NOTE — REASON FOR VISIT
[Follow-Up] : a follow-up visit [Nocturna Hypoventilation] : nocturna  hypoventilation [Sleep Apnea] : sleep apnea

## 2022-06-22 ENCOUNTER — APPOINTMENT (OUTPATIENT)
Dept: ORTHOPEDIC SURGERY | Facility: CLINIC | Age: 57
End: 2022-06-22
Payer: MEDICARE

## 2022-06-22 PROCEDURE — 99024 POSTOP FOLLOW-UP VISIT: CPT

## 2022-06-22 PROCEDURE — 73562 X-RAY EXAM OF KNEE 3: CPT | Mod: LT

## 2022-06-30 ENCOUNTER — FORM ENCOUNTER (OUTPATIENT)
Age: 57
End: 2022-06-30

## 2022-07-01 ENCOUNTER — APPOINTMENT (OUTPATIENT)
Dept: SLEEP CENTER | Facility: CLINIC | Age: 57
End: 2022-07-01

## 2022-07-01 ENCOUNTER — OUTPATIENT (OUTPATIENT)
Dept: OUTPATIENT SERVICES | Facility: HOSPITAL | Age: 57
LOS: 1 days | End: 2022-07-01
Payer: MEDICARE

## 2022-07-01 DIAGNOSIS — Z98.890 OTHER SPECIFIED POSTPROCEDURAL STATES: Chronic | ICD-10-CM

## 2022-07-01 DIAGNOSIS — Z98.891 HISTORY OF UTERINE SCAR FROM PREVIOUS SURGERY: Chronic | ICD-10-CM

## 2022-07-01 PROCEDURE — 95806 SLEEP STUDY UNATT&RESP EFFT: CPT

## 2022-07-01 PROCEDURE — 95806 SLEEP STUDY UNATT&RESP EFFT: CPT | Mod: 26

## 2022-07-28 ENCOUNTER — APPOINTMENT (OUTPATIENT)
Dept: ORTHOPEDIC SURGERY | Facility: CLINIC | Age: 57
End: 2022-07-28

## 2022-07-28 VITALS — HEIGHT: 66 IN | WEIGHT: 260 LBS | BODY MASS INDEX: 41.78 KG/M2

## 2022-07-28 PROCEDURE — 99024 POSTOP FOLLOW-UP VISIT: CPT

## 2022-08-25 ENCOUNTER — TRANSCRIPTION ENCOUNTER (OUTPATIENT)
Age: 57
End: 2022-08-25

## 2022-08-26 ENCOUNTER — TRANSCRIPTION ENCOUNTER (OUTPATIENT)
Age: 57
End: 2022-08-26

## 2022-08-31 DIAGNOSIS — G47.33 OBSTRUCTIVE SLEEP APNEA (ADULT) (PEDIATRIC): ICD-10-CM

## 2022-09-02 ENCOUNTER — FORM ENCOUNTER (OUTPATIENT)
Age: 57
End: 2022-09-02

## 2022-09-03 ENCOUNTER — APPOINTMENT (OUTPATIENT)
Dept: SLEEP CENTER | Facility: CLINIC | Age: 57
End: 2022-09-03

## 2022-09-03 ENCOUNTER — OUTPATIENT (OUTPATIENT)
Dept: OUTPATIENT SERVICES | Facility: HOSPITAL | Age: 57
LOS: 1 days | End: 2022-09-03
Payer: MEDICARE

## 2022-09-03 DIAGNOSIS — Z98.891 HISTORY OF UTERINE SCAR FROM PREVIOUS SURGERY: Chronic | ICD-10-CM

## 2022-09-03 DIAGNOSIS — Z98.890 OTHER SPECIFIED POSTPROCEDURAL STATES: Chronic | ICD-10-CM

## 2022-09-03 PROCEDURE — 95811 POLYSOM 6/>YRS CPAP 4/> PARM: CPT

## 2022-09-03 PROCEDURE — 95811 POLYSOM 6/>YRS CPAP 4/> PARM: CPT | Mod: 26

## 2022-09-22 ENCOUNTER — APPOINTMENT (OUTPATIENT)
Dept: ORTHOPEDIC SURGERY | Facility: CLINIC | Age: 57
End: 2022-09-22

## 2022-10-11 DIAGNOSIS — G47.33 OBSTRUCTIVE SLEEP APNEA (ADULT) (PEDIATRIC): ICD-10-CM

## 2022-10-26 ENCOUNTER — TRANSCRIPTION ENCOUNTER (OUTPATIENT)
Age: 57
End: 2022-10-26

## 2022-12-12 ENCOUNTER — TRANSCRIPTION ENCOUNTER (OUTPATIENT)
Age: 57
End: 2022-12-12

## 2022-12-12 RX ORDER — MONTELUKAST 10 MG/1
10 TABLET, FILM COATED ORAL
Qty: 30 | Refills: 2 | Status: ACTIVE | COMMUNITY
Start: 2021-03-24 | End: 1900-01-01

## 2023-04-25 ENCOUNTER — APPOINTMENT (OUTPATIENT)
Dept: SURGERY | Facility: CLINIC | Age: 58
End: 2023-04-25
Payer: MEDICARE

## 2023-04-25 ENCOUNTER — RESULT REVIEW (OUTPATIENT)
Age: 58
End: 2023-04-25

## 2023-04-25 VITALS
SYSTOLIC BLOOD PRESSURE: 132 MMHG | DIASTOLIC BLOOD PRESSURE: 83 MMHG | WEIGHT: 259 LBS | HEART RATE: 98 BPM | BODY MASS INDEX: 41.62 KG/M2 | HEIGHT: 66 IN

## 2023-04-25 PROCEDURE — 99204 OFFICE O/P NEW MOD 45 MIN: CPT

## 2023-04-25 RX ORDER — LOSARTAN POTASSIUM 100 MG/1
100 TABLET, FILM COATED ORAL
Refills: 0 | Status: ACTIVE | COMMUNITY

## 2023-04-25 RX ORDER — ATORVASTATIN CALCIUM 20 MG/1
20 TABLET, FILM COATED ORAL
Refills: 0 | Status: ACTIVE | COMMUNITY

## 2023-04-25 RX ORDER — FLUTICASONE PROPION/SALMETEROL 100-50 MCG
100-50 BLISTER, WITH INHALATION DEVICE INHALATION
Refills: 0 | Status: ACTIVE | COMMUNITY

## 2023-04-25 NOTE — HISTORY OF PRESENT ILLNESS
[de-identified] : Pt c/o URI and some right neck swelling.  URI resolved but Right neck remained swollen.  occasional night sweats.  denies dental or scalp infections, dysphagia or SOB, fever, skin cancer excision. \par sonogram:  Right level 2 lymph node 2.6 cm and left level 2 lymph node 3.2 cm\par I have reviewed all old and new data and available images.

## 2023-04-25 NOTE — CONSULT LETTER
[Dear  ___] : Dear  [unfilled], [Consult Letter:] : I had the pleasure of evaluating your patient, [unfilled]. [Please see my note below.] : Please see my note below. [Consult Closing:] : Thank you very much for allowing me to participate in the care of this patient.  If you have any questions, please do not hesitate to contact me. [Sincerely,] : Sincerely, [FreeTextEntry2] : Dr. Silvana Bhatti, Dr. Rebeca Gibbons, Dr. Arsenio Rodriguez [FreeTextEntry3] : Rhett Tellez MD, FACS\par System Director, Endocrine Surgery\par [facility]\par Associate  Professor of Surgery\par [employer] [facility]  [DrNed  ___] : Dr. LOCO [DrNed ___] : Dr. LOCO

## 2023-04-25 NOTE — ASSESSMENT
[FreeTextEntry1] : requested sonogram guided core biopsy of right neck node with specimen sent for flow cytometry. to call next week for results. patient has been given the opportunity to ask questions, and all of the patient's questions have been answered to their satisfaction\par

## 2023-04-25 NOTE — REASON FOR VISIT
[Initial Consultation] : an initial consultation for [FreeTextEntry2] : Cervical lymphadenopathy [Other: _____] : [unfilled]

## 2023-04-25 NOTE — PHYSICAL EXAM
[de-identified] : no palpable thyroid nodules [Laryngoscopy Performed] : laryngoscopy was performed, see procedure section for findings [Midline] : located in midline position [de-identified] : symmetrically enlarged bilaterally [Normal] : orientation to person, place, and time: normal [de-identified] : 2 cm right mid/upper jugular node, well circumscribed and mobile [de-identified] : indirect/fiberoptic laryngoscopy shows normal vocal cord mobility bilaterally with no lesions noted

## 2023-05-16 ENCOUNTER — RESULT REVIEW (OUTPATIENT)
Age: 58
End: 2023-05-16

## 2023-05-16 ENCOUNTER — OUTPATIENT (OUTPATIENT)
Dept: OUTPATIENT SERVICES | Facility: HOSPITAL | Age: 58
LOS: 1 days | End: 2023-05-16
Payer: MEDICARE

## 2023-05-16 ENCOUNTER — APPOINTMENT (OUTPATIENT)
Dept: ULTRASOUND IMAGING | Facility: IMAGING CENTER | Age: 58
End: 2023-05-16
Payer: MEDICARE

## 2023-05-16 DIAGNOSIS — R59.0 LOCALIZED ENLARGED LYMPH NODES: ICD-10-CM

## 2023-05-16 DIAGNOSIS — Z98.890 OTHER SPECIFIED POSTPROCEDURAL STATES: Chronic | ICD-10-CM

## 2023-05-16 DIAGNOSIS — Z98.891 HISTORY OF UTERINE SCAR FROM PREVIOUS SURGERY: Chronic | ICD-10-CM

## 2023-05-16 PROCEDURE — 88305 TISSUE EXAM BY PATHOLOGIST: CPT

## 2023-05-16 PROCEDURE — 88172 CYTP DX EVAL FNA 1ST EA SITE: CPT

## 2023-05-16 PROCEDURE — 10006 FNA BX W/US GDN EA ADDL: CPT

## 2023-05-16 PROCEDURE — 88305 TISSUE EXAM BY PATHOLOGIST: CPT | Mod: 26

## 2023-05-16 PROCEDURE — 88173 CYTOPATH EVAL FNA REPORT: CPT

## 2023-05-16 PROCEDURE — 88185 FLOWCYTOMETRY/TC ADD-ON: CPT

## 2023-05-16 PROCEDURE — 88184 FLOWCYTOMETRY/ TC 1 MARKER: CPT

## 2023-05-16 PROCEDURE — 88173 CYTOPATH EVAL FNA REPORT: CPT | Mod: 26

## 2023-05-16 PROCEDURE — 88108 CYTOPATH CONCENTRATE TECH: CPT | Mod: 26,59

## 2023-05-16 PROCEDURE — 88189 FLOWCYTOMETRY/READ 16 & >: CPT

## 2023-05-16 PROCEDURE — 87205 SMEAR GRAM STAIN: CPT

## 2023-05-16 PROCEDURE — 10005 FNA BX W/US GDN 1ST LES: CPT

## 2023-05-17 LAB
TM INTERPRETATION: SIGNIFICANT CHANGE UP
TM INTERPRETATION: SIGNIFICANT CHANGE UP

## 2023-05-18 LAB — NON-GYNECOLOGICAL CYTOLOGY STUDY: SIGNIFICANT CHANGE UP

## 2023-05-22 ENCOUNTER — NON-APPOINTMENT (OUTPATIENT)
Age: 58
End: 2023-05-22

## 2023-07-10 NOTE — H&P PST ADULT - ABILITY TO HEAR (WITH HEARING AID OR HEARING APPLIANCE IF NORMALLY USED):
Adequate: hears normal conversation without difficulty [place] Pregnancy And Lactation Text: There is insufficient data to evaluate drug-associated risk for major birth defects, miscarriage, or other adverse maternal or fetal outcomes.  There is a pregnancy registry that monitors pregnancy outcomes in pregnant persons exposed to the medication during pregnancy.  It is unknown if this medication is excreted in breast milk.  Do not breastfeed during treatment and for about 4 weeks after the last dose.

## 2023-07-31 ENCOUNTER — APPOINTMENT (OUTPATIENT)
Dept: ORTHOPEDIC SURGERY | Facility: CLINIC | Age: 58
End: 2023-07-31

## 2023-07-31 VITALS — HEIGHT: 66 IN | WEIGHT: 260 LBS | BODY MASS INDEX: 41.78 KG/M2

## 2023-07-31 NOTE — ADDENDUM
[FreeTextEntry1] : I, Francia Allan wrote this note acting as a scribe for Dr. Davidson Driver on Jul 31, 2023.

## 2023-07-31 NOTE — DISCUSSION/SUMMARY
[de-identified] : The underlying pathophysiology was reviewed with the patient. XR films were reviewed with the patient. Discussed at length the nature of the patients condition. The ____ symptoms are secondary to _____.  At this time,  All questions answered, understanding verbalized. Patient in agreement with plan of care. Follow up in

## 2023-07-31 NOTE — END OF VISIT
[FreeTextEntry3] : All medical record entries made by the Scribe were at my,  Dr. Davidson Driver MD., direction and personally dictated by me on 07/31/2023. I have personally reviewed the chart and agree that the record accurately reflects my personal performance of the history, physical exam, assessment and plan.

## 2023-07-31 NOTE — PHYSICAL EXAM
[de-identified] : Patient is WDWN, alert, and in no acute distress. Breathing is unlabored. She is grossly oriented to person, place, and time.  Left Knee:

## 2023-08-10 ENCOUNTER — APPOINTMENT (OUTPATIENT)
Dept: ORTHOPEDIC SURGERY | Facility: CLINIC | Age: 58
End: 2023-08-10
Payer: MEDICARE

## 2023-08-10 DIAGNOSIS — Z96.652 PRESENCE OF LEFT ARTIFICIAL KNEE JOINT: ICD-10-CM

## 2023-08-10 PROCEDURE — 99213 OFFICE O/P EST LOW 20 MIN: CPT

## 2023-08-10 PROCEDURE — 73562 X-RAY EXAM OF KNEE 3: CPT | Mod: LT

## 2023-08-11 PROBLEM — Z96.652 STATUS POST TOTAL LEFT KNEE REPLACEMENT: Status: ACTIVE | Noted: 2022-06-22

## 2023-09-19 ENCOUNTER — APPOINTMENT (OUTPATIENT)
Dept: SURGERY | Facility: CLINIC | Age: 58
End: 2023-09-19
Payer: MEDICARE

## 2023-09-19 PROCEDURE — 99214 OFFICE O/P EST MOD 30 MIN: CPT

## 2024-03-14 ENCOUNTER — APPOINTMENT (OUTPATIENT)
Dept: SURGERY | Facility: CLINIC | Age: 59
End: 2024-03-14

## 2024-06-06 ENCOUNTER — APPOINTMENT (OUTPATIENT)
Dept: SURGERY | Facility: CLINIC | Age: 59
End: 2024-06-06
Payer: MEDICARE

## 2024-06-06 DIAGNOSIS — R59.0 LOCALIZED ENLARGED LYMPH NODES: ICD-10-CM

## 2024-06-06 PROCEDURE — 99213 OFFICE O/P EST LOW 20 MIN: CPT

## 2024-06-06 NOTE — HISTORY OF PRESENT ILLNESS
[de-identified] : prior evaluation of cervical adenopathy. cytologically benign. denies any symptoms related. no changes medically since last visit. I have reviewed all old and new data and available images.  last sonogram shows benign appearing nodes, stable in size.

## 2024-06-06 NOTE — PHYSICAL EXAM
[de-identified] : no palpable thyroid nodules [Laryngoscopy Performed] : laryngoscopy was performed, see procedure section for findings [Midline] : located in midline position [de-identified] : symmetrically mildly enlarged bilaterally [Normal] : orientation to person, place, and time: normal [de-identified] : 2 cm right mid/upper jugular node, well circumscribed and mobile

## 2024-06-06 NOTE — ASSESSMENT
[FreeTextEntry1] : will observe. to return earlier if any change. patient has been given the opportunity to ask questions, and all of the patient's questions have been answered to their satisfaction.  sonogram next visit

## 2024-07-31 ENCOUNTER — APPOINTMENT (OUTPATIENT)
Dept: PULMONOLOGY | Facility: CLINIC | Age: 59
End: 2024-07-31
Payer: MEDICARE

## 2024-07-31 VITALS
RESPIRATION RATE: 15 BRPM | OXYGEN SATURATION: 89 % | BODY MASS INDEX: 42.27 KG/M2 | SYSTOLIC BLOOD PRESSURE: 119 MMHG | WEIGHT: 263 LBS | TEMPERATURE: 97 F | HEART RATE: 75 BPM | HEIGHT: 66 IN | DIASTOLIC BLOOD PRESSURE: 83 MMHG

## 2024-07-31 DIAGNOSIS — R06.02 SHORTNESS OF BREATH: ICD-10-CM

## 2024-07-31 DIAGNOSIS — E66.2 MORBID (SEVERE) OBESITY WITH ALVEOLAR HYPOVENTILATION: ICD-10-CM

## 2024-07-31 DIAGNOSIS — G47.33 OBSTRUCTIVE SLEEP APNEA (ADULT) (PEDIATRIC): ICD-10-CM

## 2024-07-31 PROCEDURE — 99214 OFFICE O/P EST MOD 30 MIN: CPT | Mod: GC

## 2024-07-31 RX ORDER — FLUTICASONE PROPIONATE AND SALMETEROL 50; 250 UG/1; UG/1
250-50 POWDER RESPIRATORY (INHALATION)
Qty: 1 | Refills: 24 | Status: ACTIVE | COMMUNITY
Start: 2024-07-31 | End: 1900-01-01

## 2024-07-31 NOTE — HISTORY OF PRESENT ILLNESS
[Obstructive Sleep Apnea] : obstructive sleep apnea [FreeTextEntry1] : Ms. Rebollar 59 yo woman who presents for the follow up with BIRD and nocturnal hypoventilation. Other comorbid conditions include HTN, HLD, prediabetes, and intermittent asthma.   Her last clinic visit was 6/20/2022- her device was returned due to noncompliance but she was interested in pursuing therapy once again. She was ordered for a bilevel machine after a titration study in September and was lost to follow up.  [employer] delivered the bilevel machine to her and it was recalled. She was sent a second machine which she wore for 2 nights but that machine was recalled as well. When she received her third machine, she was wary about using it and it was eventually taken back also. Currently she does not have a machine but is interested in starting it.   Bedtime is 8-10 PM, 3-4 AM wake up time. On average sleeps 6-8 hours per night. Does not take long to fall asleep- within 10 minutes. Wakes up 1-2 x per night for the bathroom, and often has trouble falling back asleep. She gained approximately 3 lbs since 2022 (when her last HST was performed). She feels well rested in the morning and denies dry mouth or headache.  Of note she has been transitioned from advair 250-21 mcg to the handihaler version that was covered by her insurance but she feels more dyspneic in half a city block and needing her rescue inhaler. She has also noticed increased lower extremity swelling in her legs and takes indapamide for it.   Sleep studies:  HST 8/17/20- PRAVIN 12.6, T 88% 48.5. Baseline O2 sat was 89-91% Bilevel ST titration 4/1/21- Optimal setting was 20/10 cm H2O which reduced sleep disordered breathing to within normal limits. TCO2 was as high as 58 mmHg and reduced down to 53 with bilevel settings.  HST 7/1/22- PRAVIN 5.8, T 90- 59.5, susan sat 79% Bilevel ST titration 9/3/22-   Optimal setting was 22/10 cm H2O which reduced sleep disordered breathing to within normal limits. TCO2 was as 46-51 mmHg [Snoring] : no snoring [Witnessed Apneas] : no witnessed sleep apnea [Frequent Nocturnal Awakening] : no nocturnal awakening [Unintentional Sleep while Active] : no unintentional sleep while active [Unintentional Sleep While Inactive] : no unintentional sleep while inactive [Awakes Unrefreshed] : does not awake unrefreshed [Awakes with Headache] : no headache upon awakening [Awakening With Dry Mouth] : no dry mouth upon awakening [Recent  Weight Gain] : no recent weight gain [Daytime Somnolence] : no daytime somnolence [DIS] : no DIS [DMS] : no DMS [Unusual Sleep Behavior] : no unusual sleep behavior [Hypersomnolence] : no hypersomnolence [Cataplexy] : no cataplexy [Sleep Paralysis] : no sleep paralysis [Hypnagogic Hallucinations] : no hallucinations when falling asleep [Hypnopompic Hallucinations] : no hallucinations when awakening

## 2024-07-31 NOTE — PHYSICAL EXAM
[General Appearance - Well Developed] : well developed [General Appearance - Well Nourished] : well nourished [Normal Oropharynx] : normal oropharynx [Low Lying Soft Palate] : low lying soft palate [IV] : IV [Neck Appearance] : the appearance of the neck was normal [Apical Impulse] : the apical impulse was normal [] : no respiratory distress [Bowel Sounds] : normal bowel sounds [Abnormal Walk] : normal gait [Oriented To Time, Place, And Person] : oriented to person, place, and time [1+ Pitting] : 1+  pitting

## 2024-07-31 NOTE — ASSESSMENT
[FreeTextEntry1] : Ms. Rebollar 57 yo woman who presents for the follow up with BIRD and nocturnal hypoventilation which was diagnosed in 2022 with bilevel titration demonstrating optimal setting 20/10 cm H2O which reduced sleep disordered breathing to within normal limits. She was lost to follow up for two years and hesitated to use the bilevel machine after the recall and the machine was taken away. She is interested in re-establishing care and requalifying for Bilevel.   - Repeat HST, followed by in lab Bilevel titration to help her get re-qualified for her machine - restart advair 250-21 mcg 2 puffs BID - Ordered 2d echo to evaluate for cardiogenic causes of dyspnea

## 2024-08-13 ENCOUNTER — APPOINTMENT (OUTPATIENT)
Dept: ORTHOPEDIC SURGERY | Facility: CLINIC | Age: 59
End: 2024-08-13
Payer: MEDICARE

## 2024-08-13 VITALS — WEIGHT: 263 LBS | BODY MASS INDEX: 42.27 KG/M2 | HEIGHT: 66 IN

## 2024-08-13 DIAGNOSIS — Z96.652 PRESENCE OF LEFT ARTIFICIAL KNEE JOINT: ICD-10-CM

## 2024-08-13 PROCEDURE — 99214 OFFICE O/P EST MOD 30 MIN: CPT

## 2024-08-13 PROCEDURE — 73562 X-RAY EXAM OF KNEE 3: CPT | Mod: LT

## 2024-09-17 ENCOUNTER — APPOINTMENT (OUTPATIENT)
Dept: SLEEP CENTER | Facility: CLINIC | Age: 59
End: 2024-09-17

## 2024-09-24 ENCOUNTER — APPOINTMENT (OUTPATIENT)
Dept: CV DIAGNOSITCS | Facility: HOSPITAL | Age: 59
End: 2024-09-24

## 2024-09-24 ENCOUNTER — RESULT REVIEW (OUTPATIENT)
Age: 59
End: 2024-09-24

## 2024-09-24 ENCOUNTER — OUTPATIENT (OUTPATIENT)
Dept: OUTPATIENT SERVICES | Facility: HOSPITAL | Age: 59
LOS: 1 days | End: 2024-09-24
Payer: MEDICARE

## 2024-09-24 DIAGNOSIS — R06.02 SHORTNESS OF BREATH: ICD-10-CM

## 2024-09-24 DIAGNOSIS — Z98.890 OTHER SPECIFIED POSTPROCEDURAL STATES: Chronic | ICD-10-CM

## 2024-09-24 DIAGNOSIS — Z98.891 HISTORY OF UTERINE SCAR FROM PREVIOUS SURGERY: Chronic | ICD-10-CM

## 2024-09-24 PROCEDURE — 93306 TTE W/DOPPLER COMPLETE: CPT | Mod: 26

## 2024-09-25 ENCOUNTER — APPOINTMENT (OUTPATIENT)
Dept: PULMONOLOGY | Facility: CLINIC | Age: 59
End: 2024-09-25
Payer: MEDICARE

## 2024-09-25 VITALS
BODY MASS INDEX: 42.93 KG/M2 | TEMPERATURE: 96.8 F | OXYGEN SATURATION: 93 % | HEART RATE: 87 BPM | WEIGHT: 266 LBS | DIASTOLIC BLOOD PRESSURE: 86 MMHG | SYSTOLIC BLOOD PRESSURE: 124 MMHG

## 2024-09-25 DIAGNOSIS — J96.12 CHRONIC RESPIRATORY FAILURE WITH HYPERCAPNIA: ICD-10-CM

## 2024-09-25 DIAGNOSIS — J45.909 UNSPECIFIED ASTHMA, UNCOMPLICATED: ICD-10-CM

## 2024-09-25 DIAGNOSIS — R06.02 SHORTNESS OF BREATH: ICD-10-CM

## 2024-09-25 DIAGNOSIS — G47.33 OBSTRUCTIVE SLEEP APNEA (ADULT) (PEDIATRIC): ICD-10-CM

## 2024-09-25 DIAGNOSIS — R94.2 ABNORMAL RESULTS OF PULMONARY FUNCTION STUDIES: ICD-10-CM

## 2024-09-25 PROCEDURE — 99214 OFFICE O/P EST MOD 30 MIN: CPT | Mod: 25

## 2024-09-25 PROCEDURE — 94729 DIFFUSING CAPACITY: CPT

## 2024-09-25 PROCEDURE — 94060 EVALUATION OF WHEEZING: CPT

## 2024-09-25 RX ORDER — FLUTICASONE FUROATE, UMECLIDINIUM BROMIDE AND VILANTEROL TRIFENATATE 100; 62.5; 25 UG/1; UG/1; UG/1
100-62.5-25 POWDER RESPIRATORY (INHALATION)
Qty: 1 | Refills: 3 | Status: ACTIVE | COMMUNITY
Start: 2024-09-25 | End: 1900-01-01

## 2024-09-25 RX ORDER — NEBULIZER ACCESSORIES
KIT MISCELLANEOUS
Qty: 1 | Refills: 1 | Status: ACTIVE | COMMUNITY
Start: 2024-09-25 | End: 1900-01-01

## 2024-09-25 RX ORDER — ALBUTEROL SULFATE 2.5 MG/3ML
(2.5 MG/3ML) SOLUTION RESPIRATORY (INHALATION) TWICE DAILY
Qty: 1 | Refills: 2 | Status: ACTIVE | COMMUNITY
Start: 2024-09-25 | End: 1900-01-01

## 2024-09-25 RX ORDER — ALBUTEROL SULFATE 90 UG/1
108 (90 BASE) INHALANT RESPIRATORY (INHALATION)
Qty: 1 | Refills: 5 | Status: ACTIVE | COMMUNITY
Start: 2024-09-25 | End: 1900-01-01

## 2024-09-25 RX ORDER — PREDNISONE 20 MG/1
20 TABLET ORAL DAILY
Qty: 7 | Refills: 1 | Status: ACTIVE | COMMUNITY
Start: 2024-09-25 | End: 1900-01-01

## 2024-09-25 NOTE — PHYSICAL EXAM
[No Acute Distress] : no acute distress [Normal Oropharynx] : normal oropharynx [Normal Appearance] : normal appearance [No Neck Mass] : no neck mass [Normal Rate/Rhythm] : normal rate/rhythm [Normal S1, S2] : normal s1, s2 [No Murmurs] : no murmurs [No Resp Distress] : no resp distress [Clear to Auscultation Bilaterally] : clear to auscultation bilaterally [No Abnormalities] : no abnormalities [Benign] : benign [Normal Gait] : normal gait [No Clubbing] : no clubbing [No Cyanosis] : no cyanosis [No Edema] : no edema [FROM] : FROM [Normal Color/ Pigmentation] : normal color/ pigmentation [No Focal Deficits] : no focal deficits [Oriented x3] : oriented x3 [Normal Affect] : normal affect [TextBox_2] : elevated BMI [TextBox_68] : no wheeze

## 2024-09-25 NOTE — PROCEDURE
[FreeTextEntry1] : PFT  demonstrates moderate  to severe  obstructive ventilatory defect with diminished FEV1  42%.   The FEV1/FVC ratio is diminished at  58 percent.  There was no positive bronchodilator response  There is likely  restriction   The DLCO is normal  Nik Jin MD

## 2024-09-25 NOTE — DISCUSSION/SUMMARY
[FreeTextEntry1] : 59 year old woman with long  history of asthma  She also has multiple allergies  Denies GERD or sinusitis  not sensitive to NSAID  using Breo at the time of PFT that demonstrated moderate to severe obstructive airway disease  PLAN Switch to Trelegy daily  short course prednisone 20 mg per day for 7 days  take with food  albuterol MDI as needed continue montelukast refer to allergist  She will receive PAP equipment   Total time of the encounter: 45 minutes which included but was not limited to the following: Face-to-face and non face-to-face time personally spent by the physician preparing to see the patient, obtaining and/or resuming separately obtained history, performing a medically appropriate examination and/or evaluation, counseling and educating the patient/family/caregiver, ordering medications, tests or procedures, referring and communicating with other healthcare professionals, documenting clinical information in the electronic health record, independently interpreting results and communicated results to the patient/family/caregiver and care coordination.      Nik Jin MD

## 2024-09-25 NOTE — HISTORY OF PRESENT ILLNESS
[Never] : never [TextBox_4] : 59 year old patient presents for evaluation of  asthma  She has been diagnosed with obstructive sleep apnea and awaits PAP therapy  She has used ICS LABA.   She was given Advair HFA that did not work as well as DPI   She is now on Breo for 1 week.   She is on montelukast as well.  She was last on steroid  many years ago.   She presents because she is continuing to wheeze. She has cough    She denies postnasal drip no GERD   Triggers are smells, fumes, steam, cold  She is allergic to cats.  Some seasonal effect but does not require antihistamine   Primary doctor is  Silvana Bhatti      PSH:  c section  wrist  TKR   PMH:  HTN  preDM  BIRD    Chronic respiratory failure with hypercapnia   SH:   never smoker       ETOH:  occasional     Occupation: retired, worked as     No exposure to chemicals, dust, asbestos, mold        ALLERGY:     allergic to  PCN, erythromycin bananas   benadryl rash   environmental/seasonal allergy:some       Review of Systems:   No rash, skin problems No dry eyes no mouth ulcers no sinusitis, sinus infections, nasal obstruction no dysphagia   has arthritis     no pneumonia  no lung cancer   no CAD no MI no chest pain no murmur no CHF  no edema   no peptic ulcer or gastritis no GERD no abdominal pain    pre Diabetes no thyroid disease has hyperlipidemia     no bleeding   no DVT or PE   no kidney disease   no stroke no seizure

## 2024-10-29 ENCOUNTER — OUTPATIENT (OUTPATIENT)
Dept: OUTPATIENT SERVICES | Facility: HOSPITAL | Age: 59
LOS: 1 days | End: 2024-10-29
Payer: MEDICARE

## 2024-10-29 ENCOUNTER — APPOINTMENT (OUTPATIENT)
Dept: CT IMAGING | Facility: IMAGING CENTER | Age: 59
End: 2024-10-29

## 2024-10-29 ENCOUNTER — APPOINTMENT (OUTPATIENT)
Dept: PULMONOLOGY | Facility: CLINIC | Age: 59
End: 2024-10-29
Payer: MEDICARE

## 2024-10-29 VITALS
OXYGEN SATURATION: 95 % | HEART RATE: 87 BPM | WEIGHT: 266 LBS | TEMPERATURE: 96 F | BODY MASS INDEX: 42.93 KG/M2 | DIASTOLIC BLOOD PRESSURE: 86 MMHG | SYSTOLIC BLOOD PRESSURE: 128 MMHG

## 2024-10-29 DIAGNOSIS — Z98.890 OTHER SPECIFIED POSTPROCEDURAL STATES: Chronic | ICD-10-CM

## 2024-10-29 DIAGNOSIS — J45.909 UNSPECIFIED ASTHMA, UNCOMPLICATED: ICD-10-CM

## 2024-10-29 DIAGNOSIS — I10 ESSENTIAL (PRIMARY) HYPERTENSION: ICD-10-CM

## 2024-10-29 DIAGNOSIS — R91.8 OTHER NONSPECIFIC ABNORMAL FINDING OF LUNG FIELD: ICD-10-CM

## 2024-10-29 DIAGNOSIS — J96.12 CHRONIC RESPIRATORY FAILURE WITH HYPERCAPNIA: ICD-10-CM

## 2024-10-29 DIAGNOSIS — Z98.891 HISTORY OF UTERINE SCAR FROM PREVIOUS SURGERY: Chronic | ICD-10-CM

## 2024-10-29 DIAGNOSIS — Z87.01 PERSONAL HISTORY OF PNEUMONIA (RECURRENT): ICD-10-CM

## 2024-10-29 DIAGNOSIS — G47.33 OBSTRUCTIVE SLEEP APNEA (ADULT) (PEDIATRIC): ICD-10-CM

## 2024-10-29 PROCEDURE — 94729 DIFFUSING CAPACITY: CPT

## 2024-10-29 PROCEDURE — 99214 OFFICE O/P EST MOD 30 MIN: CPT | Mod: 25

## 2024-10-29 PROCEDURE — 94060 EVALUATION OF WHEEZING: CPT

## 2024-10-29 PROCEDURE — 94727 GAS DIL/WSHOT DETER LNG VOL: CPT

## 2024-10-29 PROCEDURE — 71250 CT THORAX DX C-: CPT | Mod: 26,MH

## 2024-10-29 RX ORDER — GUAIFENESIN 400 MG/1
400 TABLET ORAL
Qty: 40 | Refills: 1 | Status: ACTIVE | COMMUNITY
Start: 2024-10-29 | End: 1900-01-01

## 2024-10-31 ENCOUNTER — APPOINTMENT (OUTPATIENT)
Dept: PULMONOLOGY | Facility: CLINIC | Age: 59
End: 2024-10-31
Payer: MEDICARE

## 2024-10-31 DIAGNOSIS — G47.33 OBSTRUCTIVE SLEEP APNEA (ADULT) (PEDIATRIC): ICD-10-CM

## 2024-10-31 DIAGNOSIS — E66.2 MORBID (SEVERE) OBESITY WITH ALVEOLAR HYPOVENTILATION: ICD-10-CM

## 2024-10-31 PROCEDURE — 99214 OFFICE O/P EST MOD 30 MIN: CPT

## 2024-11-14 ENCOUNTER — NON-APPOINTMENT (OUTPATIENT)
Age: 59
End: 2024-11-14

## 2024-11-20 PROCEDURE — 71250 CT THORAX DX C-: CPT

## 2024-11-26 ENCOUNTER — APPOINTMENT (OUTPATIENT)
Dept: SLEEP CENTER | Facility: CLINIC | Age: 59
End: 2024-11-26

## 2024-11-26 ENCOUNTER — NON-APPOINTMENT (OUTPATIENT)
Age: 59
End: 2024-11-26

## 2024-12-03 ENCOUNTER — APPOINTMENT (OUTPATIENT)
Dept: PULMONOLOGY | Facility: CLINIC | Age: 59
End: 2024-12-03
Payer: MEDICARE

## 2024-12-03 VITALS
HEIGHT: 66 IN | OXYGEN SATURATION: 98 % | DIASTOLIC BLOOD PRESSURE: 80 MMHG | HEART RATE: 83 BPM | WEIGHT: 263 LBS | SYSTOLIC BLOOD PRESSURE: 122 MMHG | BODY MASS INDEX: 42.27 KG/M2 | TEMPERATURE: 97 F

## 2024-12-03 DIAGNOSIS — G47.33 OBSTRUCTIVE SLEEP APNEA (ADULT) (PEDIATRIC): ICD-10-CM

## 2024-12-03 DIAGNOSIS — J47.9 BRONCHIECTASIS, UNCOMPLICATED: ICD-10-CM

## 2024-12-03 DIAGNOSIS — R91.8 OTHER NONSPECIFIC ABNORMAL FINDING OF LUNG FIELD: ICD-10-CM

## 2024-12-03 DIAGNOSIS — E66.2 MORBID (SEVERE) OBESITY WITH ALVEOLAR HYPOVENTILATION: ICD-10-CM

## 2024-12-03 DIAGNOSIS — J45.909 UNSPECIFIED ASTHMA, UNCOMPLICATED: ICD-10-CM

## 2024-12-03 DIAGNOSIS — R94.2 ABNORMAL RESULTS OF PULMONARY FUNCTION STUDIES: ICD-10-CM

## 2024-12-03 PROCEDURE — 99215 OFFICE O/P EST HI 40 MIN: CPT

## 2024-12-03 RX ORDER — MUCUS CLEARING DEVICE
EACH MISCELLANEOUS
Qty: 1 | Refills: 3 | Status: ACTIVE | COMMUNITY
Start: 2024-12-03 | End: 1900-01-01

## 2024-12-04 LAB
ANISOCYTOSIS BLD QL: SLIGHT
BASOPHILS # BLD AUTO: 0.06 K/UL
BASOPHILS NFR BLD AUTO: 1 %
EOSINOPHIL # BLD AUTO: 0.32 K/UL
EOSINOPHIL # BLD MANUAL: 300 /UL
EOSINOPHIL NFR BLD AUTO: 5 %
HCT VFR BLD CALC: 39.3 %
HGB BLD-MCNC: 11.9 G/DL
LYMPHOCYTES # BLD AUTO: 2.54 K/UL
LYMPHOCYTES NFR BLD AUTO: 40 %
MCHC RBC-ENTMCNC: 25.1 PG
MCHC RBC-ENTMCNC: 30.3 G/DL
MCV RBC AUTO: 82.9 FL
MONOCYTES # BLD AUTO: 0.51 K/UL
MONOCYTES NFR BLD AUTO: 8 %
MSMEAR: NORMAL
NEUTROPHILS # BLD AUTO: 2.93 K/UL
NEUTROPHILS NFR BLD AUTO: 46 %
PLAT MORPH BLD: NORMAL
PLATELET # BLD AUTO: 339 K/UL
RBC # BLD: 4.74 M/UL
RBC # FLD: 17 %
RBC BLD AUTO: ABNORMAL
TOTAL IGE SMQN RAST: 61 KU/L
WBC # FLD AUTO: 6.36 K/UL

## 2024-12-07 LAB
A FLAVUS AB FLD QL: NEGATIVE
A FUMIGATUS AB FLD QL: NEGATIVE
A NIGER AB FLD QL: NEGATIVE

## 2024-12-08 LAB — BACTERIA SPT CULT: ABNORMAL

## 2024-12-10 ENCOUNTER — APPOINTMENT (OUTPATIENT)
Dept: PULMONOLOGY | Facility: CLINIC | Age: 59
End: 2024-12-10

## 2024-12-10 LAB
A FLAVUS AB SER QL ID: ABNORMAL
A FUMIGATUS1 AB SER QL ID: NORMAL
A FUMIGATUS2 AB SER QL ID: NORMAL
A FUMIGATUS3 AB SER QL ID: NORMAL
A FUMIGATUS6 AB SER QL ID: NORMAL
A PULLULANS AB SER QL ID: NORMAL
ANNOTATION COMMENT IMP: NORMAL
BEEF IGE QN: <0.1 KU/L
EPID ALLERG MIX73 IGE QN: NEGATIVE KU/L
P BETAE IGE QN: 0.8 KU/L
PIGEON SERUM AB QL ID: NORMAL
PORK IGE QN: <0.1 KU/L
S RECTIVIRGULA AB SER QL ID: NORMAL
S VIRIDIS AB SER QL ID: NORMAL
T CANDIDUS AB SER QL: NORMAL

## 2024-12-14 LAB — ACID FAST STN SPT: NORMAL

## 2024-12-30 ENCOUNTER — APPOINTMENT (OUTPATIENT)
Dept: SLEEP CENTER | Facility: CLINIC | Age: 59
End: 2024-12-30
Payer: MEDICARE

## 2024-12-30 ENCOUNTER — OUTPATIENT (OUTPATIENT)
Dept: OUTPATIENT SERVICES | Facility: HOSPITAL | Age: 59
LOS: 1 days | End: 2024-12-30
Payer: MEDICARE

## 2024-12-30 DIAGNOSIS — Z98.890 OTHER SPECIFIED POSTPROCEDURAL STATES: Chronic | ICD-10-CM

## 2024-12-30 DIAGNOSIS — Z98.891 HISTORY OF UTERINE SCAR FROM PREVIOUS SURGERY: Chronic | ICD-10-CM

## 2024-12-30 PROCEDURE — 95800 SLP STDY UNATTENDED: CPT | Mod: 26

## 2024-12-30 PROCEDURE — 95800 SLP STDY UNATTENDED: CPT

## 2025-01-03 DIAGNOSIS — G47.33 OBSTRUCTIVE SLEEP APNEA (ADULT) (PEDIATRIC): ICD-10-CM

## 2025-01-07 ENCOUNTER — APPOINTMENT (OUTPATIENT)
Dept: PULMONOLOGY | Facility: CLINIC | Age: 60
End: 2025-01-07

## 2025-01-07 ENCOUNTER — APPOINTMENT (OUTPATIENT)
Dept: SURGERY | Facility: CLINIC | Age: 60
End: 2025-01-07
Payer: MEDICARE

## 2025-01-07 DIAGNOSIS — R59.0 LOCALIZED ENLARGED LYMPH NODES: ICD-10-CM

## 2025-01-07 PROCEDURE — 99213 OFFICE O/P EST LOW 20 MIN: CPT

## 2025-01-10 ENCOUNTER — APPOINTMENT (OUTPATIENT)
Dept: PULMONOLOGY | Facility: CLINIC | Age: 60
End: 2025-01-10
Payer: MEDICARE

## 2025-01-10 PROCEDURE — 99212 OFFICE O/P EST SF 10 MIN: CPT

## 2025-02-03 ENCOUNTER — APPOINTMENT (OUTPATIENT)
Facility: HOSPITAL | Age: 60
End: 2025-02-03

## 2025-02-03 ENCOUNTER — OUTPATIENT (OUTPATIENT)
Dept: OUTPATIENT SERVICES | Facility: HOSPITAL | Age: 60
LOS: 1 days | Discharge: HOME | End: 2025-02-03

## 2025-02-03 DIAGNOSIS — G47.30 SLEEP APNEA, UNSPECIFIED: ICD-10-CM

## 2025-02-03 DIAGNOSIS — Z98.891 HISTORY OF UTERINE SCAR FROM PREVIOUS SURGERY: Chronic | ICD-10-CM

## 2025-02-03 DIAGNOSIS — Z98.890 OTHER SPECIFIED POSTPROCEDURAL STATES: Chronic | ICD-10-CM

## 2025-02-03 PROCEDURE — 95811 POLYSOM 6/>YRS CPAP 4/> PARM: CPT | Mod: 26

## 2025-02-25 ENCOUNTER — APPOINTMENT (OUTPATIENT)
Dept: PULMONOLOGY | Facility: CLINIC | Age: 60
End: 2025-02-25
Payer: MEDICARE

## 2025-02-25 DIAGNOSIS — E66.2 MORBID (SEVERE) OBESITY WITH ALVEOLAR HYPOVENTILATION: ICD-10-CM

## 2025-02-25 DIAGNOSIS — G47.33 OBSTRUCTIVE SLEEP APNEA (ADULT) (PEDIATRIC): ICD-10-CM

## 2025-02-25 PROCEDURE — 99212 OFFICE O/P EST SF 10 MIN: CPT | Mod: 2W

## 2025-02-25 PROCEDURE — G2211 COMPLEX E/M VISIT ADD ON: CPT | Mod: 2W

## (undated) DEVICE — SYR LUER LOK 20CC

## (undated) DEVICE — ELCTR BOVIE PENCIL SMOKE EVACUATION

## (undated) DEVICE — SUT POLYSORB 1 36" GS-21 UNDYED

## (undated) DEVICE — GLV 8.5 PROTEXIS (WHITE)

## (undated) DEVICE — GLV 7.5 PROTEXIS (WHITE)

## (undated) DEVICE — PACK TOTAL KNEE (2 PACKS)

## (undated) DEVICE — MAKO BLADE STANDARD

## (undated) DEVICE — MAKO VIZADISC KNEE TRACKING KIT

## (undated) DEVICE — WOUND IRR IRRISEPT W 0.5 CHG

## (undated) DEVICE — SAW BLADE STRYKER SAGITTAL EXTRA WIDE THIN SHORT

## (undated) DEVICE — SUT POLYSORB 0 30" GS-21 UNDYED

## (undated) DEVICE — DRAPE 3/4 SHEET W REINFORCEMENT 56X77"

## (undated) DEVICE — VENODYNE/SCD SLEEVE CALF LARGE

## (undated) DEVICE — DRSG AQUACEL 3.5 X 10"

## (undated) DEVICE — SOL IRR POUR NS 0.9% 500ML

## (undated) DEVICE — WARMING BLANKET UPPER ADULT

## (undated) DEVICE — SUT POLYSORB 2-0 30" GS-21 UNDYED

## (undated) DEVICE — DRSG DERMABOND 0.7ML

## (undated) DEVICE — POSITIONER FOAM EGG CRATE ULNAR 2PCS (PINK)

## (undated) DEVICE — MAKO DRAPE KIT

## (undated) DEVICE — NDL HYPO SAFE 22G X 1.5" (BLACK)

## (undated) DEVICE — TIBIA PREP SZ4

## (undated) DEVICE — GLV 6.5 PROTEXIS (WHITE)

## (undated) DEVICE — SOL IRR POUR H2O 1000ML

## (undated) DEVICE — FEMORAL PREP SINGLE USE SIZE 4

## (undated) DEVICE — GLV 7 PROTEXIS (WHITE)

## (undated) DEVICE — SUT PDO 2 1/2 CIRCLE 40MM NDL 45CM

## (undated) DEVICE — POSITIONER CARDIAC BUMP

## (undated) DEVICE — TOURNIQUET CUFF 34" DUAL PORT W PLC

## (undated) DEVICE — GLV 8 PROTEXIS (WHITE)

## (undated) DEVICE — SPECIMEN CONTAINER 100ML

## (undated) DEVICE — SUT QUILL MONODERM 2-0 30CM 24MM